# Patient Record
Sex: FEMALE | Race: WHITE | URBAN - METROPOLITAN AREA
[De-identification: names, ages, dates, MRNs, and addresses within clinical notes are randomized per-mention and may not be internally consistent; named-entity substitution may affect disease eponyms.]

---

## 2017-01-28 ENCOUNTER — OUTPATIENT (OUTPATIENT)
Dept: OUTPATIENT SERVICES | Facility: HOSPITAL | Age: 47
LOS: 1 days | Discharge: HOME | End: 2017-01-28

## 2017-06-27 DIAGNOSIS — Z12.31 ENCOUNTER FOR SCREENING MAMMOGRAM FOR MALIGNANT NEOPLASM OF BREAST: ICD-10-CM

## 2017-12-08 ENCOUNTER — OUTPATIENT (OUTPATIENT)
Dept: OUTPATIENT SERVICES | Facility: HOSPITAL | Age: 47
LOS: 1 days | Discharge: HOME | End: 2017-12-08

## 2017-12-08 DIAGNOSIS — N63.10 UNSPECIFIED LUMP IN THE RIGHT BREAST, UNSPECIFIED QUADRANT: ICD-10-CM

## 2018-01-08 PROBLEM — Z00.00 ENCOUNTER FOR PREVENTIVE HEALTH EXAMINATION: Status: ACTIVE | Noted: 2018-01-08

## 2018-02-05 ENCOUNTER — APPOINTMENT (OUTPATIENT)
Dept: SURGERY | Facility: CLINIC | Age: 48
End: 2018-02-05
Payer: COMMERCIAL

## 2018-02-05 VITALS
WEIGHT: 194 LBS | BODY MASS INDEX: 36.16 KG/M2 | SYSTOLIC BLOOD PRESSURE: 130 MMHG | HEIGHT: 61.5 IN | DIASTOLIC BLOOD PRESSURE: 78 MMHG

## 2018-02-05 DIAGNOSIS — Z87.2 PERSONAL HISTORY OF DISEASES OF THE SKIN AND SUBCUTANEOUS TISSUE: ICD-10-CM

## 2018-02-05 DIAGNOSIS — F15.90 OTHER STIMULANT USE, UNSPECIFIED, UNCOMPLICATED: ICD-10-CM

## 2018-02-05 PROCEDURE — 99202 OFFICE O/P NEW SF 15 MIN: CPT

## 2018-02-15 ENCOUNTER — OUTPATIENT (OUTPATIENT)
Dept: OUTPATIENT SERVICES | Facility: HOSPITAL | Age: 48
LOS: 1 days | Discharge: HOME | End: 2018-02-15

## 2018-02-15 VITALS
HEART RATE: 76 BPM | RESPIRATION RATE: 16 BRPM | SYSTOLIC BLOOD PRESSURE: 131 MMHG | HEIGHT: 61 IN | WEIGHT: 190.04 LBS | OXYGEN SATURATION: 96 % | TEMPERATURE: 96 F | DIASTOLIC BLOOD PRESSURE: 88 MMHG

## 2018-02-15 DIAGNOSIS — Z98.891 HISTORY OF UTERINE SCAR FROM PREVIOUS SURGERY: Chronic | ICD-10-CM

## 2018-02-15 DIAGNOSIS — L72.0 EPIDERMAL CYST: ICD-10-CM

## 2018-02-15 DIAGNOSIS — Z90.49 ACQUIRED ABSENCE OF OTHER SPECIFIED PARTS OF DIGESTIVE TRACT: Chronic | ICD-10-CM

## 2018-02-15 DIAGNOSIS — Z98.890 OTHER SPECIFIED POSTPROCEDURAL STATES: Chronic | ICD-10-CM

## 2018-02-15 DIAGNOSIS — S36.00XA UNSPECIFIED INJURY OF SPLEEN, INITIAL ENCOUNTER: Chronic | ICD-10-CM

## 2018-02-15 DIAGNOSIS — Z90.89 ACQUIRED ABSENCE OF OTHER ORGANS: Chronic | ICD-10-CM

## 2018-02-15 DIAGNOSIS — Z01.818 ENCOUNTER FOR OTHER PREPROCEDURAL EXAMINATION: ICD-10-CM

## 2018-02-15 NOTE — H&P PST ADULT - NSANTHOSAYNRD_GEN_A_CORE
No. KATHIE screening performed.  STOP BANG Legend: 0-2 = LOW Risk; 3-4 = INTERMEDIATE Risk; 5-8 = HIGH Risk/see tool
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
## 2018-02-15 NOTE — H&P PST ADULT - REASON FOR ADMISSION
48 yo female presents with c/o right breast cyst "I have had it for the past 5 yrs& it got infected in november& I had antibiotics& now  is removing it" pt denies any current drainage  denies cp,palp,sob,dyspnea,dysuria; ex sophie: 2 fos/blocks w/o sob

## 2018-02-16 LAB
ANION GAP SERPL CALC-SCNC: 14 MMOL/L — SIGNIFICANT CHANGE UP (ref 7–14)
APTT BLD: 29.1 SEC — SIGNIFICANT CHANGE UP (ref 27–39.2)
BASOPHILS # BLD AUTO: 0.12 K/UL — SIGNIFICANT CHANGE UP (ref 0–0.2)
BASOPHILS NFR BLD AUTO: 0.8 % — SIGNIFICANT CHANGE UP (ref 0–1)
BUN SERPL-MCNC: 16 MG/DL — SIGNIFICANT CHANGE UP (ref 10–20)
CALCIUM SERPL-MCNC: 9.6 MG/DL — SIGNIFICANT CHANGE UP (ref 8.5–10.1)
CHLORIDE SERPL-SCNC: 102 MMOL/L — SIGNIFICANT CHANGE UP (ref 98–110)
CO2 SERPL-SCNC: 23 MMOL/L — SIGNIFICANT CHANGE UP (ref 17–32)
CREAT SERPL-MCNC: 0.7 MG/DL — SIGNIFICANT CHANGE UP (ref 0.7–1.5)
EOSINOPHIL # BLD AUTO: 0.87 K/UL — HIGH (ref 0–0.7)
EOSINOPHIL NFR BLD AUTO: 5.7 % — SIGNIFICANT CHANGE UP (ref 0–8)
GLUCOSE SERPL-MCNC: 55 MG/DL — LOW (ref 70–110)
HCT VFR BLD CALC: 41.3 % — SIGNIFICANT CHANGE UP (ref 37–47)
HGB BLD-MCNC: 13.5 G/DL — LOW (ref 14–18)
IMM GRANULOCYTES NFR BLD AUTO: 0.3 % — SIGNIFICANT CHANGE UP (ref 0.1–0.3)
INR BLD: 0.95 RATIO — SIGNIFICANT CHANGE UP (ref 0.65–1.3)
LYMPHOCYTES # BLD AUTO: 40.5 % — SIGNIFICANT CHANGE UP (ref 20.5–51.1)
LYMPHOCYTES # BLD AUTO: 6.15 K/UL — HIGH (ref 1.2–3.4)
MCHC RBC-ENTMCNC: 30.8 PG — SIGNIFICANT CHANGE UP (ref 27–31)
MCHC RBC-ENTMCNC: 32.7 G/DL — SIGNIFICANT CHANGE UP (ref 32–37)
MCV RBC AUTO: 94.3 FL — HIGH (ref 81–91)
MONOCYTES # BLD AUTO: 0.96 K/UL — HIGH (ref 0.1–0.6)
MONOCYTES NFR BLD AUTO: 6.3 % — SIGNIFICANT CHANGE UP (ref 1.7–9.3)
NEUTROPHILS # BLD AUTO: 7.06 K/UL — HIGH (ref 1.4–6.5)
NEUTROPHILS NFR BLD AUTO: 46.4 % — SIGNIFICANT CHANGE UP (ref 42.2–75.2)
NRBC # BLD: 0 /100 WBCS — SIGNIFICANT CHANGE UP (ref 0–0)
PLATELET # BLD AUTO: 392 K/UL — SIGNIFICANT CHANGE UP (ref 130–400)
POTASSIUM SERPL-MCNC: 4.5 MMOL/L — SIGNIFICANT CHANGE UP (ref 3.5–5)
POTASSIUM SERPL-SCNC: 4.5 MMOL/L — SIGNIFICANT CHANGE UP (ref 3.5–5)
PROTHROM AB SERPL-ACNC: 10.3 SEC — SIGNIFICANT CHANGE UP (ref 9.95–12.87)
RBC # BLD: 4.38 M/UL — SIGNIFICANT CHANGE UP (ref 4.2–5.4)
RBC # FLD: 13.3 % — SIGNIFICANT CHANGE UP (ref 11.5–14.5)
SODIUM SERPL-SCNC: 139 MMOL/L — SIGNIFICANT CHANGE UP (ref 135–146)
WBC # BLD: 15.2 K/UL — HIGH (ref 4.8–10.8)
WBC # FLD AUTO: 15.2 K/UL — HIGH (ref 4.8–10.8)

## 2018-02-19 ENCOUNTER — TRANSCRIPTION ENCOUNTER (OUTPATIENT)
Age: 48
End: 2018-02-19

## 2018-02-28 ENCOUNTER — OUTPATIENT (OUTPATIENT)
Dept: OUTPATIENT SERVICES | Facility: HOSPITAL | Age: 48
LOS: 1 days | Discharge: HOME | End: 2018-02-28

## 2018-02-28 ENCOUNTER — RESULT REVIEW (OUTPATIENT)
Age: 48
End: 2018-02-28

## 2018-02-28 VITALS
TEMPERATURE: 98 F | RESPIRATION RATE: 18 BRPM | DIASTOLIC BLOOD PRESSURE: 69 MMHG | OXYGEN SATURATION: 96 % | WEIGHT: 190.04 LBS | HEIGHT: 61 IN | SYSTOLIC BLOOD PRESSURE: 129 MMHG | HEART RATE: 70 BPM

## 2018-02-28 VITALS
RESPIRATION RATE: 22 BRPM | SYSTOLIC BLOOD PRESSURE: 128 MMHG | DIASTOLIC BLOOD PRESSURE: 75 MMHG | HEART RATE: 76 BPM | OXYGEN SATURATION: 95 % | TEMPERATURE: 98 F

## 2018-02-28 DIAGNOSIS — Z98.890 OTHER SPECIFIED POSTPROCEDURAL STATES: Chronic | ICD-10-CM

## 2018-02-28 DIAGNOSIS — S36.00XA UNSPECIFIED INJURY OF SPLEEN, INITIAL ENCOUNTER: Chronic | ICD-10-CM

## 2018-02-28 DIAGNOSIS — Z90.49 ACQUIRED ABSENCE OF OTHER SPECIFIED PARTS OF DIGESTIVE TRACT: Chronic | ICD-10-CM

## 2018-02-28 DIAGNOSIS — Z98.891 HISTORY OF UTERINE SCAR FROM PREVIOUS SURGERY: Chronic | ICD-10-CM

## 2018-02-28 DIAGNOSIS — Z90.89 ACQUIRED ABSENCE OF OTHER ORGANS: Chronic | ICD-10-CM

## 2018-02-28 RX ORDER — SODIUM CHLORIDE 9 MG/ML
1000 INJECTION, SOLUTION INTRAVENOUS
Qty: 0 | Refills: 0 | Status: DISCONTINUED | OUTPATIENT
Start: 2018-02-28 | End: 2018-03-15

## 2018-02-28 RX ORDER — KETOROLAC TROMETHAMINE 30 MG/ML
30 SYRINGE (ML) INJECTION ONCE
Qty: 0 | Refills: 0 | Status: DISCONTINUED | OUTPATIENT
Start: 2018-02-28 | End: 2018-02-28

## 2018-02-28 NOTE — CHART NOTE - NSCHARTNOTEFT_GEN_A_CORE
PACU ANESTHESIA ADMISSION NOTE      Procedure:   Post op diagnosis:  Breast cyst, right      ____  Intubated  TV:______       Rate: ______      FiO2: ______    __x__  Patent Airway    __x__  Full return of protective reflexes    ____x  Full recovery from anesthesia / back to baseline     Vitals:   T: 98.2          R: 16                 BP: 115/55                 Sat: 99                  P: 72      Mental Status: x ____ Awake   x_____ Alert   _____ Drowsy   _____ Sedated    Nausea/Vomiting:  _x___ NO  ______Yes, x  See Post - Op Orders        x  Pain Scale (0-10):  __0___    Treatment: ____ None  x ____ See Post - Op/PCA Orders    Post - Operative Fluids:   ____ Oral   ____ See Post - Op Orders    Plan: Discharge:  x ____Home       _____Floor     _____Critical Care    _____  Other:_________________    Comments:

## 2018-02-28 NOTE — ASU DISCHARGE PLAN (ADULT/PEDIATRIC). - NOTIFY
Fever greater than 101/Swelling that continues/Persistent Nausea and Vomiting/Bleeding that does not stop/Pain not relieved by Medications

## 2018-02-28 NOTE — ASU DISCHARGE PLAN (ADULT/PEDIATRIC). - MEDICATION SUMMARY - MEDICATIONS TO TAKE
I will START or STAY ON the medications listed below when I get home from the hospital:    Tylenol 500 mg oral tablet  -- 2 tab(s) by mouth every 6 hours  -- Indication: For L72.0,60879

## 2018-03-05 DIAGNOSIS — Z88.5 ALLERGY STATUS TO NARCOTIC AGENT: ICD-10-CM

## 2018-03-05 DIAGNOSIS — N60.01 SOLITARY CYST OF RIGHT BREAST: ICD-10-CM

## 2018-03-06 DIAGNOSIS — L72.0 EPIDERMAL CYST: ICD-10-CM

## 2018-03-12 ENCOUNTER — APPOINTMENT (OUTPATIENT)
Dept: SURGERY | Facility: CLINIC | Age: 48
End: 2018-03-12
Payer: COMMERCIAL

## 2018-03-12 VITALS
WEIGHT: 190 LBS | HEIGHT: 61.5 IN | BODY MASS INDEX: 35.41 KG/M2 | DIASTOLIC BLOOD PRESSURE: 76 MMHG | SYSTOLIC BLOOD PRESSURE: 132 MMHG

## 2018-03-12 DIAGNOSIS — L72.0 EPIDERMAL CYST: ICD-10-CM

## 2018-03-12 PROCEDURE — 99024 POSTOP FOLLOW-UP VISIT: CPT

## 2018-04-24 NOTE — PRE-ANESTHESIA EVALUATION ADULT - NSANTHDIETYNSD_GEN_ALL_CORE
Patient requests refill of medication, pharmacy set up and verified.  She has appointment with you 5/23 but will need BC by 5/6.  
Yes

## 2018-12-15 ENCOUNTER — OUTPATIENT (OUTPATIENT)
Dept: OUTPATIENT SERVICES | Facility: HOSPITAL | Age: 48
LOS: 1 days | Discharge: HOME | End: 2018-12-15

## 2018-12-15 DIAGNOSIS — Z12.31 ENCOUNTER FOR SCREENING MAMMOGRAM FOR MALIGNANT NEOPLASM OF BREAST: ICD-10-CM

## 2018-12-15 DIAGNOSIS — Z98.891 HISTORY OF UTERINE SCAR FROM PREVIOUS SURGERY: Chronic | ICD-10-CM

## 2018-12-15 DIAGNOSIS — S36.00XA UNSPECIFIED INJURY OF SPLEEN, INITIAL ENCOUNTER: Chronic | ICD-10-CM

## 2018-12-15 DIAGNOSIS — Z98.890 OTHER SPECIFIED POSTPROCEDURAL STATES: Chronic | ICD-10-CM

## 2018-12-15 DIAGNOSIS — Z90.89 ACQUIRED ABSENCE OF OTHER ORGANS: Chronic | ICD-10-CM

## 2018-12-15 DIAGNOSIS — Z90.49 ACQUIRED ABSENCE OF OTHER SPECIFIED PARTS OF DIGESTIVE TRACT: Chronic | ICD-10-CM

## 2018-12-15 PROBLEM — E66.9 OBESITY, UNSPECIFIED: Chronic | Status: ACTIVE | Noted: 2018-02-15

## 2018-12-15 PROBLEM — K44.9 DIAPHRAGMATIC HERNIA WITHOUT OBSTRUCTION OR GANGRENE: Chronic | Status: ACTIVE | Noted: 2018-02-15

## 2019-12-26 ENCOUNTER — FORM ENCOUNTER (OUTPATIENT)
Age: 49
End: 2019-12-26

## 2019-12-27 ENCOUNTER — OUTPATIENT (OUTPATIENT)
Dept: OUTPATIENT SERVICES | Facility: HOSPITAL | Age: 49
LOS: 1 days | Discharge: HOME | End: 2019-12-27
Payer: COMMERCIAL

## 2019-12-27 DIAGNOSIS — Z98.890 OTHER SPECIFIED POSTPROCEDURAL STATES: Chronic | ICD-10-CM

## 2019-12-27 DIAGNOSIS — S36.00XA UNSPECIFIED INJURY OF SPLEEN, INITIAL ENCOUNTER: Chronic | ICD-10-CM

## 2019-12-27 DIAGNOSIS — Z90.49 ACQUIRED ABSENCE OF OTHER SPECIFIED PARTS OF DIGESTIVE TRACT: Chronic | ICD-10-CM

## 2019-12-27 DIAGNOSIS — Z90.89 ACQUIRED ABSENCE OF OTHER ORGANS: Chronic | ICD-10-CM

## 2019-12-27 DIAGNOSIS — Z98.891 HISTORY OF UTERINE SCAR FROM PREVIOUS SURGERY: Chronic | ICD-10-CM

## 2019-12-27 DIAGNOSIS — Z12.31 ENCOUNTER FOR SCREENING MAMMOGRAM FOR MALIGNANT NEOPLASM OF BREAST: ICD-10-CM

## 2019-12-27 PROCEDURE — 77067 SCR MAMMO BI INCL CAD: CPT | Mod: 26

## 2019-12-27 PROCEDURE — 77063 BREAST TOMOSYNTHESIS BI: CPT | Mod: 26

## 2020-01-17 ENCOUNTER — APPOINTMENT (OUTPATIENT)
Dept: OBGYN | Facility: CLINIC | Age: 50
End: 2020-01-17
Payer: COMMERCIAL

## 2020-01-17 VITALS — SYSTOLIC BLOOD PRESSURE: 122 MMHG | DIASTOLIC BLOOD PRESSURE: 80 MMHG

## 2020-01-17 DIAGNOSIS — Z86.19 PERSONAL HISTORY OF OTHER INFECTIOUS AND PARASITIC DISEASES: ICD-10-CM

## 2020-01-17 DIAGNOSIS — Z87.42 PERSONAL HISTORY OF OTHER DISEASES OF THE FEMALE GENITAL TRACT: ICD-10-CM

## 2020-01-17 DIAGNOSIS — Z80.3 FAMILY HISTORY OF MALIGNANT NEOPLASM OF BREAST: ICD-10-CM

## 2020-01-17 DIAGNOSIS — R35.0 FREQUENCY OF MICTURITION: ICD-10-CM

## 2020-01-17 DIAGNOSIS — R23.2 FLUSHING: ICD-10-CM

## 2020-01-17 DIAGNOSIS — N95.1 MENOPAUSAL AND FEMALE CLIMACTERIC STATES: ICD-10-CM

## 2020-01-17 PROCEDURE — 99396 PREV VISIT EST AGE 40-64: CPT

## 2020-01-17 PROCEDURE — 99213 OFFICE O/P EST LOW 20 MIN: CPT | Mod: 25

## 2020-04-12 ENCOUNTER — TRANSCRIPTION ENCOUNTER (OUTPATIENT)
Age: 50
End: 2020-04-12

## 2020-04-15 ENCOUNTER — APPOINTMENT (OUTPATIENT)
Dept: CARDIOTHORACIC SURGERY | Facility: CLINIC | Age: 50
End: 2020-04-15
Payer: COMMERCIAL

## 2020-04-15 VITALS
OXYGEN SATURATION: 96 % | WEIGHT: 174 LBS | HEART RATE: 61 BPM | RESPIRATION RATE: 13 BRPM | SYSTOLIC BLOOD PRESSURE: 142 MMHG | BODY MASS INDEX: 32.02 KG/M2 | DIASTOLIC BLOOD PRESSURE: 102 MMHG | HEIGHT: 62 IN | TEMPERATURE: 98.2 F

## 2020-04-15 VITALS — SYSTOLIC BLOOD PRESSURE: 131 MMHG | DIASTOLIC BLOOD PRESSURE: 89 MMHG

## 2020-04-15 PROCEDURE — 99244 OFF/OP CNSLTJ NEW/EST MOD 40: CPT

## 2020-04-15 NOTE — PHYSICAL EXAM
[General Appearance - Alert] : alert [General Appearance - In No Acute Distress] : in no acute distress [Abnormal Walk] : normal gait [Nail Clubbing] : no clubbing  or cyanosis of the fingernails [Musculoskeletal - Swelling] : no joint swelling seen [Motor Tone] : muscle strength and tone were normal [Deep Tendon Reflexes (DTR)] : deep tendon reflexes were 2+ and symmetric [Sensation] : the sensory exam was normal to light touch and pinprick [No Focal Deficits] : no focal deficits [Oriented To Time, Place, And Person] : oriented to person, place, and time [Impaired Insight] : insight and judgment were intact [Affect] : the affect was normal [] : no respiratory distress [Auscultation Breath Sounds / Voice Sounds] : lungs were clear to auscultation bilaterally [Heart Rate And Rhythm] : heart rate was normal and rhythm regular [Heart Sounds] : normal S1 and S2 [Heart Sounds Gallop] : no gallops [Murmurs] : no murmurs [Heart Sounds Pericardial Friction Rub] : no pericardial rub [Cervical Lymph Nodes Enlarged Posterior Bilaterally] : posterior cervical [Cervical Lymph Nodes Enlarged Anterior Bilaterally] : anterior cervical [Supraclavicular Lymph Nodes Enlarged Bilaterally] : supraclavicular

## 2020-04-20 NOTE — ASSESSMENT
[FreeTextEntry1] : Ms. SHREE FISCHER is a 49 year F PMH measles, mumps, who presents to our office today for a consultation for a lung mass referred to our office by Dr. Scott. Radiologic imaging reviewed. Further diagnostic testing, including biopsy by IR, surgical treatment options discussed at length with the patient and her     in attendance. All risks and benefits were explained to the patient. Educational handouts were given for the patient to review at home. At present she will have a PET scan and PFTs done with a tentative plan for the OR in 6-8 weeks (in light of the COVID19 pandemic), which will be preceded by presurgical testing. She will start Zyban for smoking cessation.  Patient verbalizes understanding and is in agreement.\par \par CHUY, Jeremi Ward, [facility]-BC, am acting as scribe for Dr. Al Pacheco.\par \par I personally performed the services described in the documentation, reviewed the documentation recorded by the scribe in my presence and it accurately and completely records my words and actions.\par \par \par \par \par \par
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
## 2020-04-20 NOTE — DATA REVIEWED
[FreeTextEntry1] : IMPRESSION:\par \par Spiculated mass left upper lobe suspicious for malignancy. This corresponds to the abnormality questioned on chest x-ray\par Nodule superior to it in the left lower lobe likely local metastases.\par Subsolid lesion right upper lobe should be closely followed up. Metastatic lesion not entirely excluded.\par Mediastinal adenopathy is minimally larger as compared to the previous study. However none of the lymph nodes are enlarged by absolute criteria.\par \par \par \par \par \par \par SUSPICIOUS LESION LEFT UPPER LOBE. THIS CORRESPONDS TO THE CHEST X-RAY FINDING.\par \par CT CHEST WITHOUT IV CONTRAST\par \par CLINICAL INDICATION: Follow-up to previous. No known primary. 30 pack years smoking history chest x-ray abnormality questioned left upper lobe\par \par COMPARISON: 2016\par \par TECHNIQUE: Noncontrast chest CT was performed. Multiplanar CT and HRCT images were reviewed.\par \par FINDINGS:\par \par LUNGS, AIRWAYS: Trachea main and visualized segmental bronchi patent. Trachea deviated to the left secondary to a right-sided aortic arch.\par Patchy groundglass densities throughout both lungs. These are more predominantly in the upper and mid lung fields. There were present previously also.\par There is a new spiculated mass in the left upper lobe 2.6 x 1.8 cm. This is suspicious for neoplasm.\par Superior to it in image 11 is a pleural-based density measuring 8 mm. It likely represents local tumor extension.\par Few millimeter subsolid nodule in the right upper lobe best seen in series 4 image 101. It measures 8 mm. The solid component measures 4 mm.\par Minimal subsegmental atelectasis in the right middle lobe.\par Scarring in the left lower lobe stable.\par Minimal subsegmental atelectasis in the lingula stable.\par \par PLEURA: No pleural disease.\par \par MEDIASTINUM, ARI, LYMPH NODES: No enlarged axillary adenopathy. Superior mediastinal adenopathy image 16. There are largest lymph node has a short axis of 8 mm. Previously 6 mm. It is not significant by size.\par Adenopathy in the aortopulmonary window is also larger. Lymph nodes have a short axis of 8 mm and 10 mm. Previously 6 mm and 7 mm respectively.\par Precarinal adenopathy short axis IX mm stable. No subcarinal nodes. No definite left hilar nodes. Thoracic esophagus normal.\par \par HEART AND VESSELS: Heart size normal. 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 Ascending aorta measures 3.1 cm which is normal. Descending aorta is on the right and measures 2.6 cm which is normal.\par \par UPPER ABDOMEN: Cholecystectomy. Left nephrectomy with compensatory hypertrophy of the right kidney. Spleen is small and lobulated in shape. This is as previously..\par \par BONES AND SOFT TISSUES: Soft tissues normal. Small epigastric hernia containing fat in image 58. It was present previously. It is unchanged in size. Old healed rib fractures on the left. No aggressive bony lesion.\par \par LOWER NECK: Normal.\par \par ================================================================================\par \par

## 2020-04-22 ENCOUNTER — RESULT REVIEW (OUTPATIENT)
Age: 50
End: 2020-04-22

## 2020-04-22 ENCOUNTER — OUTPATIENT (OUTPATIENT)
Dept: OUTPATIENT SERVICES | Facility: HOSPITAL | Age: 50
LOS: 1 days | Discharge: HOME | End: 2020-04-22
Payer: COMMERCIAL

## 2020-04-22 DIAGNOSIS — Z98.891 HISTORY OF UTERINE SCAR FROM PREVIOUS SURGERY: Chronic | ICD-10-CM

## 2020-04-22 DIAGNOSIS — Z90.49 ACQUIRED ABSENCE OF OTHER SPECIFIED PARTS OF DIGESTIVE TRACT: Chronic | ICD-10-CM

## 2020-04-22 DIAGNOSIS — S36.00XA UNSPECIFIED INJURY OF SPLEEN, INITIAL ENCOUNTER: Chronic | ICD-10-CM

## 2020-04-22 DIAGNOSIS — Z98.890 OTHER SPECIFIED POSTPROCEDURAL STATES: Chronic | ICD-10-CM

## 2020-04-22 DIAGNOSIS — R91.8 OTHER NONSPECIFIC ABNORMAL FINDING OF LUNG FIELD: ICD-10-CM

## 2020-04-22 DIAGNOSIS — Z90.89 ACQUIRED ABSENCE OF OTHER ORGANS: Chronic | ICD-10-CM

## 2020-04-22 LAB — GLUCOSE BLDC GLUCOMTR-MCNC: 91 MG/DL — SIGNIFICANT CHANGE UP (ref 70–99)

## 2020-04-22 PROCEDURE — 78815 PET IMAGE W/CT SKULL-THIGH: CPT | Mod: 26,PI

## 2020-04-29 ENCOUNTER — APPOINTMENT (OUTPATIENT)
Dept: CARDIOTHORACIC SURGERY | Facility: CLINIC | Age: 50
End: 2020-04-29
Payer: COMMERCIAL

## 2020-04-29 PROCEDURE — 99213 OFFICE O/P EST LOW 20 MIN: CPT | Mod: 95

## 2020-04-30 NOTE — PHYSICAL EXAM
[Oriented To Time, Place, And Person] : oriented to person, place, and time [Impaired Insight] : insight and judgment were intact [Affect] : the affect was normal

## 2020-04-30 NOTE — ASSESSMENT
[FreeTextEntry1] : Ms. Libertad Sheets 50 y/o F was called today using the [employer] telehealth platform for a follow up visit for surgical planning and for review of PET scan results. Patient reports doing well overall, has had no sick contacts and has quit smoking for 1 week with the use of Zyban. Radiologic imaging reviewed. Abnormal FDG uptake was noted in the following areas: "left upper lobe irregular opacity, a more superior 8 mm pleural-based nodule within the left upper lobe, and a 1.1 cm right paratracheal lymph node suspicious for biologic tumor activity."  Plan for EBUS 5/8/20. She was made aware that if the lymph node is positive, she would have St 3 disease and would then need an MRI of the brain to be done to rule out metastasis, which a positive result would then put her with St. 4 disease. If the lymph node is negative, she will then proceed to part 2 of her procedure which might entail a GEORGE lobectomy.  She will have 2 separate PAST dates for each procedure, the first of which will occur on 4/30 @ 815am, and COVID testing on 5/6/20. Patient verbalizes understanding, is in agreement with the plan and wishes to proceed. \par \par I, Jeremi Ward, [facility]-BC, am acting as scribe for Dr. Al Pacheco.\par I personally performed the services described in the documentation, reviewed the documentation recorded by the scribe in my presence and it accurately and completely records my words and actions.\par \par \par

## 2020-04-30 NOTE — DATA REVIEWED
[FreeTextEntry1] : EXAM: PETCT [facility] ONC FDG INIT -  2020 \par \par \par These images reveal abnormal FDG uptake coregistering with a 2.0 x 1.6 cm \par left upper lobe irregular opacity containing air-filled bronchi (SUV 7.0), a \par more superior 8 mm pleural-based nodule within the left upper lobe (SUV 4.5) \par and a 1.1 cm right paratracheal lymph node (SUV 3.5) suspicious for biologic \par tumor activity. There is nonspecific mild FDG uptake in a 6 mm right \par axillary lymph node (SUV 2.7), series 4 image 140. There are no other \par definite sites of abnormal FDG uptake to suggest biologic tumor activity. \par \par \par Additional CT findings: Sphenoid sinus polyp versus mucus retention cyst. \par Right thyroid lobe hypodense nodule. Opacities within the central bronchi \par compatible with secretions. Emphysema. Nonspecific biapical groundglass \par opacities which may be inflammatory in etiology. 3 mm nodule in the right \par upper lobe, series 4 image 118. Right-sided aortic arch with aberrant left \par subclavian artery. Patient is post cholecystectomy. Absence of the left \par kidney. Left adnexal cyst. Sigmoid diverticulosis. Degenerative change of \par the spine. Right sacroiliac joint fusion and evidence of changes. \par \par IMPRESSION: \par \par Abnormal FDG uptake coregistering with a 2.0 x 1.6 cm left upper lobe \par irregular opacity (SUV 7.0), a more superior 8 mm pleural-based nodule \par within the left upper lobe (SUV 4.5) and a 1.1 cm right paratracheal lymph \par node (SUV 3.5) suspicious for biologic tumor activity. \par \par Nonspecific mild FDG uptake in a 6 mm right axillary lymph node (SUV 2.7). \par \par No other definite sites of abnormal FDG uptake to suggest biologic tumor \par activity. \par \par =======================================================================\par \par Patient Name:  SHREE FISCHER	:   1970	  \par EXAM:  [facility]_RR_CHEST_WO (ADULT) - 3/18/20\par \par \par IMPRESSION:\par \par Spiculated mass left upper lobe suspicious for malignancy. This corresponds to the abnormality questioned on chest x-ray\par Nodule superior to it in the left lower lobe likely local metastases.\par Subsolid lesion right upper lobe should be closely followed up. Metastatic lesion not entirely excluded.\par Mediastinal adenopathy is minimally larger as compared to the previous study. However none of the lymph nodes are enlarged by absolute criteria.\par \par \par \par \par \par \par SUSPICIOUS LESION LEFT UPPER LOBE. THIS CORRESPONDS TO THE CHEST X-RAY FINDING.\par \par CT CHEST WITHOUT IV CONTRAST\par \par CLINICAL INDICATION: Follow-up to previous. No known primary. 30 pack years smoking history chest x-ray abnormality questioned left upper lobe\par \par COMPARISON: 2016\par \par TECHNIQUE: Noncontrast chest CT was performed. Multiplanar CT and HRCT images were reviewed.\par \par FINDINGS:\par \par LUNGS, AIRWAYS: Trachea main and visualized segmental bronchi patent. Trachea deviated to the left secondary to a right-sided aortic arch.\par Patchy groundglass densities throughout both lungs. These are more predominantly in the upper and mid lung fields. There were present previously also.\par There is a new spiculated mass in the left upper lobe 2.6 x 1.8 cm. This is suspicious for neoplasm.\par Superior to it in image 11 is a pleural-based density measuring 8 mm. It likely represents local tumor extension.\par Few millimeter subsolid nodule in the right upper lobe best seen in series 4 image 101. It measures 8 mm. The solid component measures 4 mm.\par Minimal subsegmental atelectasis in the right middle lobe.\par Scarring in the left lower lobe stable.\par Minimal subsegmental atelectasis in the lingula stable.\par \par PLEURA: No pleural disease.\par \par MEDIASTINUM, ARI, LYMPH NODES: No enlarged axillary adenopathy. Superior mediastinal adenopathy image 16. There are largest lymph node has a short axis of 8 mm. Previously 6 mm. It is not significant by size.\par Adenopathy in the aortopulmonary window is also larger. Lymph nodes have a short axis of 8 mm and 10 mm. Previously 6 mm and 7 mm respectively.\par Precarinal adenopathy short axis IX mm stable. No subcarinal nodes. No definite left hilar nodes. Thoracic esophagus normal.\par \par HEART AND VESSELS: Heart size normal. 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 Ascending aorta measures 3.1 cm which is normal. Descending aorta is on the right and measures 2.6 cm which is normal.\par \par UPPER ABDOMEN: Cholecystectomy. Left nephrectomy with compensatory hypertrophy of the right kidney. Spleen is small and lobulated in shape. This is as previously..\par \par BONES AND SOFT TISSUES: Soft tissues normal. Small epigastric hernia containing fat in image 58. It was present previously. It is unchanged in size. Old healed rib fractures on the left. No aggressive bony lesion.\par \par LOWER NECK: Normal.\par \par

## 2020-04-30 NOTE — HISTORY OF PRESENT ILLNESS
[Home] : at home, [unfilled] , at the time of the visit. [Medical Office: ([facility])___] : at the medical office located in  [Patient] : the patient [Self] : self [FreeTextEntry1] : Patient reports doing well overall, reports no sick contacts and states that with the help of the Zyban, she proudly reports having successfully quit smoking for exactly 1 week now. \par \par Her healthcare team is as follows:\par PMD: Gerardo Scott\par GI: Duc Man\par \par  [FreeTextEntry4] : AFMILIA Gambino

## 2020-05-01 ENCOUNTER — OUTPATIENT (OUTPATIENT)
Dept: OUTPATIENT SERVICES | Facility: HOSPITAL | Age: 50
LOS: 1 days | Discharge: HOME | End: 2020-05-01
Payer: COMMERCIAL

## 2020-05-01 VITALS
RESPIRATION RATE: 18 BRPM | DIASTOLIC BLOOD PRESSURE: 78 MMHG | HEIGHT: 62 IN | TEMPERATURE: 97 F | HEART RATE: 69 BPM | OXYGEN SATURATION: 97 % | SYSTOLIC BLOOD PRESSURE: 154 MMHG | WEIGHT: 179.68 LBS

## 2020-05-01 DIAGNOSIS — Z90.49 ACQUIRED ABSENCE OF OTHER SPECIFIED PARTS OF DIGESTIVE TRACT: Chronic | ICD-10-CM

## 2020-05-01 DIAGNOSIS — Z52.4 KIDNEY DONOR: Chronic | ICD-10-CM

## 2020-05-01 DIAGNOSIS — Z98.890 OTHER SPECIFIED POSTPROCEDURAL STATES: Chronic | ICD-10-CM

## 2020-05-01 DIAGNOSIS — R91.8 OTHER NONSPECIFIC ABNORMAL FINDING OF LUNG FIELD: ICD-10-CM

## 2020-05-01 DIAGNOSIS — Z01.818 ENCOUNTER FOR OTHER PREPROCEDURAL EXAMINATION: ICD-10-CM

## 2020-05-01 DIAGNOSIS — S36.00XA UNSPECIFIED INJURY OF SPLEEN, INITIAL ENCOUNTER: Chronic | ICD-10-CM

## 2020-05-01 DIAGNOSIS — Z98.891 HISTORY OF UTERINE SCAR FROM PREVIOUS SURGERY: Chronic | ICD-10-CM

## 2020-05-01 DIAGNOSIS — Z90.89 ACQUIRED ABSENCE OF OTHER ORGANS: Chronic | ICD-10-CM

## 2020-05-01 LAB
ALBUMIN SERPL ELPH-MCNC: 4.7 G/DL — SIGNIFICANT CHANGE UP (ref 3.5–5.2)
ALP SERPL-CCNC: 86 U/L — SIGNIFICANT CHANGE UP (ref 30–115)
ALT FLD-CCNC: 18 U/L — SIGNIFICANT CHANGE UP (ref 0–41)
ANION GAP SERPL CALC-SCNC: 11 MMOL/L — SIGNIFICANT CHANGE UP (ref 7–14)
APPEARANCE UR: CLEAR — SIGNIFICANT CHANGE UP
APTT BLD: 28.7 SEC — SIGNIFICANT CHANGE UP (ref 27–39.2)
AST SERPL-CCNC: 14 U/L — SIGNIFICANT CHANGE UP (ref 0–41)
BASOPHILS # BLD AUTO: 0.12 K/UL — SIGNIFICANT CHANGE UP (ref 0–0.2)
BASOPHILS NFR BLD AUTO: 1.2 % — HIGH (ref 0–1)
BILIRUB SERPL-MCNC: 0.4 MG/DL — SIGNIFICANT CHANGE UP (ref 0.2–1.2)
BILIRUB UR-MCNC: NEGATIVE — SIGNIFICANT CHANGE UP
BLD GP AB SCN SERPL QL: SIGNIFICANT CHANGE UP
BUN SERPL-MCNC: 12 MG/DL — SIGNIFICANT CHANGE UP (ref 10–20)
CALCIUM SERPL-MCNC: 9.7 MG/DL — SIGNIFICANT CHANGE UP (ref 8.5–10.1)
CHLORIDE SERPL-SCNC: 104 MMOL/L — SIGNIFICANT CHANGE UP (ref 98–110)
CO2 SERPL-SCNC: 26 MMOL/L — SIGNIFICANT CHANGE UP (ref 17–32)
COLOR SPEC: SIGNIFICANT CHANGE UP
CREAT SERPL-MCNC: 0.8 MG/DL — SIGNIFICANT CHANGE UP (ref 0.7–1.5)
DIFF PNL FLD: NEGATIVE — SIGNIFICANT CHANGE UP
EOSINOPHIL # BLD AUTO: 1.1 K/UL — HIGH (ref 0–0.7)
EOSINOPHIL NFR BLD AUTO: 11.2 % — HIGH (ref 0–8)
GLUCOSE SERPL-MCNC: 78 MG/DL — SIGNIFICANT CHANGE UP (ref 70–99)
GLUCOSE UR QL: NEGATIVE — SIGNIFICANT CHANGE UP
HCT VFR BLD CALC: 41 % — SIGNIFICANT CHANGE UP (ref 37–47)
HGB BLD-MCNC: 13.1 G/DL — SIGNIFICANT CHANGE UP (ref 12–16)
IMM GRANULOCYTES NFR BLD AUTO: 0.4 % — HIGH (ref 0.1–0.3)
INR BLD: 0.96 RATIO — SIGNIFICANT CHANGE UP (ref 0.65–1.3)
KETONES UR-MCNC: NEGATIVE — SIGNIFICANT CHANGE UP
LEUKOCYTE ESTERASE UR-ACNC: NEGATIVE — SIGNIFICANT CHANGE UP
LYMPHOCYTES # BLD AUTO: 4.11 K/UL — HIGH (ref 1.2–3.4)
LYMPHOCYTES # BLD AUTO: 42 % — SIGNIFICANT CHANGE UP (ref 20.5–51.1)
MCHC RBC-ENTMCNC: 31.2 PG — HIGH (ref 27–31)
MCHC RBC-ENTMCNC: 32 G/DL — SIGNIFICANT CHANGE UP (ref 32–37)
MCV RBC AUTO: 97.6 FL — SIGNIFICANT CHANGE UP (ref 81–99)
MONOCYTES # BLD AUTO: 0.7 K/UL — HIGH (ref 0.1–0.6)
MONOCYTES NFR BLD AUTO: 7.2 % — SIGNIFICANT CHANGE UP (ref 1.7–9.3)
NEUTROPHILS # BLD AUTO: 3.71 K/UL — SIGNIFICANT CHANGE UP (ref 1.4–6.5)
NEUTROPHILS NFR BLD AUTO: 38 % — LOW (ref 42.2–75.2)
NITRITE UR-MCNC: NEGATIVE — SIGNIFICANT CHANGE UP
NRBC # BLD: 0 /100 WBCS — SIGNIFICANT CHANGE UP (ref 0–0)
PH UR: 6.5 — SIGNIFICANT CHANGE UP (ref 5–8)
PLATELET # BLD AUTO: 341 K/UL — SIGNIFICANT CHANGE UP (ref 130–400)
POTASSIUM SERPL-MCNC: 5.3 MMOL/L — HIGH (ref 3.5–5)
POTASSIUM SERPL-SCNC: 5.3 MMOL/L — HIGH (ref 3.5–5)
PROT SERPL-MCNC: 7.3 G/DL — SIGNIFICANT CHANGE UP (ref 6–8)
PROT UR-MCNC: NEGATIVE — SIGNIFICANT CHANGE UP
PROTHROM AB SERPL-ACNC: 11 SEC — SIGNIFICANT CHANGE UP (ref 9.95–12.87)
RBC # BLD: 4.2 M/UL — SIGNIFICANT CHANGE UP (ref 4.2–5.4)
RBC # FLD: 13.2 % — SIGNIFICANT CHANGE UP (ref 11.5–14.5)
SODIUM SERPL-SCNC: 141 MMOL/L — SIGNIFICANT CHANGE UP (ref 135–146)
SP GR SPEC: 1.01 — SIGNIFICANT CHANGE UP (ref 1.01–1.02)
UROBILINOGEN FLD QL: SIGNIFICANT CHANGE UP
WBC # BLD: 9.78 K/UL — SIGNIFICANT CHANGE UP (ref 4.8–10.8)
WBC # FLD AUTO: 9.78 K/UL — SIGNIFICANT CHANGE UP (ref 4.8–10.8)

## 2020-05-01 PROCEDURE — 93010 ELECTROCARDIOGRAM REPORT: CPT

## 2020-05-01 RX ORDER — ACETAMINOPHEN 500 MG
2 TABLET ORAL
Qty: 0 | Refills: 0 | DISCHARGE
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
## 2020-05-01 NOTE — H&P PST ADULT - HISTORY OF PRESENT ILLNESS
PATIENT CURRENTLY DENIES CHEST PAIN  SHORTNESS OF BREATH  PALPITATIONS,  DYSURIA, OR UPPER RESPIRATORY INFECTION IN PAST 2 WEEKS  EXERCISE  TOLERANCE  1-2 FLIGHT OF STAIRS  WITHOUT SHORTNESS OF BREATH   denies travel outside the [place] in the past 30 days   denies any covid s/s

## 2020-05-01 NOTE — H&P PST ADULT - NSICDXPASTMEDICALHX_GEN_ALL_CORE_FT
PAST MEDICAL HISTORY:  COPD (chronic obstructive pulmonary disease) pt denies    Enlarged lymph node     Hiatal hernia     Obesity

## 2020-05-01 NOTE — H&P PST ADULT - NSANTHOSAYNRD_GEN_A_CORE
No. KATHIE screening performed.  STOP BANG Legend: 0-2 = LOW Risk; 3-4 = INTERMEDIATE Risk; 5-8 = HIGH Risk/test negative

## 2020-05-01 NOTE — H&P PST ADULT - REASON FOR ADMISSION
endobronchial ultrasound hx: smoking on yrly cxr abnormal, ct abnorma, pet scan lite up  and has borderline enlarged lymph node

## 2020-05-06 ENCOUNTER — OUTPATIENT (OUTPATIENT)
Dept: OUTPATIENT SERVICES | Facility: HOSPITAL | Age: 50
LOS: 1 days | Discharge: HOME | End: 2020-05-06

## 2020-05-06 DIAGNOSIS — Z11.59 ENCOUNTER FOR SCREENING FOR OTHER VIRAL DISEASES: ICD-10-CM

## 2020-05-06 DIAGNOSIS — S36.00XA UNSPECIFIED INJURY OF SPLEEN, INITIAL ENCOUNTER: Chronic | ICD-10-CM

## 2020-05-06 DIAGNOSIS — Z98.890 OTHER SPECIFIED POSTPROCEDURAL STATES: Chronic | ICD-10-CM

## 2020-05-06 DIAGNOSIS — Z90.89 ACQUIRED ABSENCE OF OTHER ORGANS: Chronic | ICD-10-CM

## 2020-05-06 DIAGNOSIS — Z01.818 ENCOUNTER FOR OTHER PREPROCEDURAL EXAMINATION: ICD-10-CM

## 2020-05-06 DIAGNOSIS — Z52.4 KIDNEY DONOR: Chronic | ICD-10-CM

## 2020-05-06 DIAGNOSIS — Z98.891 HISTORY OF UTERINE SCAR FROM PREVIOUS SURGERY: Chronic | ICD-10-CM

## 2020-05-06 DIAGNOSIS — Z90.49 ACQUIRED ABSENCE OF OTHER SPECIFIED PARTS OF DIGESTIVE TRACT: Chronic | ICD-10-CM

## 2020-05-06 PROBLEM — R59.9 ENLARGED LYMPH NODES, UNSPECIFIED: Chronic | Status: ACTIVE | Noted: 2020-05-01

## 2020-05-06 PROBLEM — J44.9 CHRONIC OBSTRUCTIVE PULMONARY DISEASE, UNSPECIFIED: Chronic | Status: ACTIVE | Noted: 2018-02-15

## 2020-05-07 VITALS — HEIGHT: 62 IN | WEIGHT: 179.9 LBS

## 2020-05-07 LAB — SARS-COV-2 RNA SPEC QL NAA+PROBE: SIGNIFICANT CHANGE UP
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
## 2020-05-07 NOTE — PATIENT PROFILE ADULT - DISASTER - NSPROIMPLANTSMEDDEV_GEN_A_NUR
None Gen: denies fever, chills  HENT: denies throat pain, nasal congestion  Eye: denies changes in vision, eye pain  CV: denies chest pain, palpitations  Pulm: + SOB, cough, sputum production  Abd: denies abd pain, N/V/D/C  : denies hematuria, dysuria  Skin: denies rash, itching  Ext: + LE edema, +foot and hand and finger cramping  Neuro: denies HA, dizziness, lightheadedness

## 2020-05-07 NOTE — PRE-OP CHECKLIST - INTERNAL PROSTHESES
Chief Complaint   Patient presents with    Medication Refill     Cymbalta    Cold Symptoms     chest congestion, productive cough, sneezing, OTC Zycam taken     1. Have you been to the ER, urgent care clinic since your last visit? Hospitalized since your last visit? Seen by a physician in [place] for a tooth abscess. 2. Have you seen or consulted any other health care providers outside of the [address] [place] [address] since your last visit? Include any pap smears or colon screening. No    Visit Vitals  /72 (BP 1 Location: Left arm, BP Patient Position: Sitting)   Pulse 87   Temp 98.7 °F (37.1 °C) (Oral)   Resp 18   Ht 5' 9\" (1.753 m)   Wt 194 lb 12.8 oz (88.4 kg)   SpO2 98%   BMI 28.77 kg/m² no

## 2020-05-08 ENCOUNTER — TRANSCRIPTION ENCOUNTER (OUTPATIENT)
Age: 50
End: 2020-05-08

## 2020-05-08 ENCOUNTER — RESULT REVIEW (OUTPATIENT)
Age: 50
End: 2020-05-08

## 2020-05-08 ENCOUNTER — OUTPATIENT (OUTPATIENT)
Dept: OUTPATIENT SERVICES | Facility: HOSPITAL | Age: 50
LOS: 1 days | Discharge: HOME | End: 2020-05-08
Payer: COMMERCIAL

## 2020-05-08 VITALS — HEART RATE: 78 BPM | RESPIRATION RATE: 24 BRPM | OXYGEN SATURATION: 97 %

## 2020-05-08 DIAGNOSIS — Z52.4 KIDNEY DONOR: Chronic | ICD-10-CM

## 2020-05-08 DIAGNOSIS — Z90.89 ACQUIRED ABSENCE OF OTHER ORGANS: Chronic | ICD-10-CM

## 2020-05-08 DIAGNOSIS — Z98.890 OTHER SPECIFIED POSTPROCEDURAL STATES: Chronic | ICD-10-CM

## 2020-05-08 DIAGNOSIS — J44.9 CHRONIC OBSTRUCTIVE PULMONARY DISEASE, UNSPECIFIED: ICD-10-CM

## 2020-05-08 DIAGNOSIS — Z90.49 ACQUIRED ABSENCE OF OTHER SPECIFIED PARTS OF DIGESTIVE TRACT: Chronic | ICD-10-CM

## 2020-05-08 DIAGNOSIS — S36.00XA UNSPECIFIED INJURY OF SPLEEN, INITIAL ENCOUNTER: Chronic | ICD-10-CM

## 2020-05-08 DIAGNOSIS — Z98.891 HISTORY OF UTERINE SCAR FROM PREVIOUS SURGERY: Chronic | ICD-10-CM

## 2020-05-08 DIAGNOSIS — R59.0 LOCALIZED ENLARGED LYMPH NODES: ICD-10-CM

## 2020-05-08 DIAGNOSIS — E66.9 OBESITY, UNSPECIFIED: ICD-10-CM

## 2020-05-08 DIAGNOSIS — F17.210 NICOTINE DEPENDENCE, CIGARETTES, UNCOMPLICATED: ICD-10-CM

## 2020-05-08 PROCEDURE — 88305 TISSUE EXAM BY PATHOLOGIST: CPT | Mod: 26

## 2020-05-08 PROCEDURE — 71045 X-RAY EXAM CHEST 1 VIEW: CPT | Mod: 26

## 2020-05-08 PROCEDURE — 31652 BRONCH EBUS SAMPLNG 1/2 NODE: CPT

## 2020-05-08 PROCEDURE — 88342 IMHCHEM/IMCYTCHM 1ST ANTB: CPT | Mod: 26

## 2020-05-08 PROCEDURE — 88173 CYTOPATH EVAL FNA REPORT: CPT | Mod: 26

## 2020-05-08 PROCEDURE — 88341 IMHCHEM/IMCYTCHM EA ADD ANTB: CPT | Mod: 26

## 2020-05-08 NOTE — PACU DISCHARGE NOTE - COMMENTS
Patient transported to CTU to complete recovery from anesthesia.    Post-op vitals:  /55  HR 84  RR 16   SpO2 100  T 98.3

## 2020-05-08 NOTE — PACU DISCHARGE NOTE - AIRWAY PATENCY:
Subjective:   Patient ID:  Tri Staton is a 54 y.o. female who presents for follow up of Hospital Follow Up      55 yo female, f/u for severe multi-vessel CAD. Wheelchair bound after CVA.  PMH recent Dx of CAD and CVA with residual left side weakness in , DVT, HTN, MI, hypercholesteremia, and neuropathy.  , had chest pain, dyspnea and could not move. Went to [facility]. Dx of CVA. D/c'ed. Few days later, felt worse and fever. Went to hospital again. Had cath done and CABG was advised. Pt decided to go back to  for medical care.   Went to ER [facility] on  for LLE swelling and pain.  US revealed positive DVT and started eliquis. Chest CTA moderate pericardial effusion and no PE. D/c'ed from ER.  10/04, c/o chest pain and weakness. With hemarroids. Sent to ER and troponin < 0.006 and EKG did not show acute stt change.  Still c/o angina and palpitation. Has constipation  Feels fatigue.  Left side weakness and leg swelling.  No dyspnea, and dizziness.   ECHo showed normal EF and moderate pericardial effusion. No tamponade.     Quit smoking 3 months, 1 cigar a day.x 10 yrs  Quit drinking 6 months, 1 pint of liquor a day x10 yrs.  Smoked marijuana  Smoked cocaines one yr ago for 10 yrs, remote IV cocaine 25 yrs ago.              Past Medical History:   Diagnosis Date    Coronary artery disease     Coronary artery disease of native artery of native heart with stable angina pectoris 2018    Hypercholesteremia     Hypertension     Neuropathy     Stroke        Past Surgical History:   Procedure Laterality Date     SECTION         Social History     Tobacco Use    Smoking status: Former Smoker   Substance Use Topics    Alcohol use: No     Frequency: Never    Drug use: No       No family history on file.      Review of Systems   Constitution: Negative for decreased appetite, diaphoresis, fever, weakness, malaise/fatigue and night sweats.   HENT: Negative for  nosebleeds.    Eyes: Negative for blurred vision and double vision.   Cardiovascular: Positive for leg swelling. Negative for chest pain, claudication, dyspnea on exertion, irregular heartbeat, near-syncope, orthopnea, palpitations, paroxysmal nocturnal dyspnea and syncope.   Respiratory: Negative for cough, shortness of breath, sleep disturbances due to breathing, snoring, sputum production and wheezing.    Endocrine: Negative for cold intolerance and polyuria.   Hematologic/Lymphatic: Does not bruise/bleed easily.   Skin: Negative for rash.   Musculoskeletal: Positive for muscle weakness. Negative for back pain, falls, joint pain, joint swelling and neck pain.   Gastrointestinal: Negative for abdominal pain, heartburn, nausea and vomiting.   Genitourinary: Negative for dysuria, frequency and hematuria.   Neurological: Positive for loss of balance. Negative for difficulty with concentration, dizziness, focal weakness, headaches, light-headedness, numbness and seizures.   Psychiatric/Behavioral: Negative for depression, memory loss and substance abuse. The patient does not have insomnia.    Allergic/Immunologic: Negative for HIV exposure and hives.       Objective:   Physical Exam   Constitutional: She is oriented to person, place, and time. She appears well-nourished.   HENT:   Head: Normocephalic.   Eyes: Pupils are equal, round, and reactive to light.   Neck: Normal carotid pulses and no JVD present. Carotid bruit is not present. No thyromegaly present.   Cardiovascular: Normal rate, regular rhythm, normal heart sounds and normal pulses.  No extrasystoles are present. PMI is not displaced. Exam reveals no gallop and no S3.   No murmur heard.  Pulmonary/Chest: Breath sounds normal. No stridor. No respiratory distress.   Abdominal: Soft. Bowel sounds are normal. There is no tenderness. There is no rebound.   Musculoskeletal: Normal range of motion. She exhibits edema.   Trace edema   Neurological: She is alert and  oriented to person, place, and time.   Skin: Skin is intact. No rash noted.   Psychiatric: Her behavior is normal.       No results found for: CHOL  No results found for: HDL  No results found for: LDLCALC  No results found for: TRIG  No results found for: CHOLHDL    Chemistry        Component Value Date/Time     10/04/2018 1805    K 4.2 10/04/2018 1805     10/04/2018 1805    CO2 22 (L) 10/04/2018 1805    BUN 16 10/04/2018 1805    CREATININE 1.0 10/04/2018 1805    GLU 94 10/04/2018 1805        Component Value Date/Time    CALCIUM 9.5 10/04/2018 1805    ALKPHOS 73 10/04/2018 1805     (H) 10/04/2018 1805    ALT 84 (H) 10/04/2018 1805    BILITOT 0.5 10/04/2018 1805    ESTGFRAFRICA >60 10/04/2018 1805    EGFRNONAA >60 10/04/2018 1805          No results found for: LABA1C, HGBA1C  No results found for: TSH  Lab Results   Component Value Date    INR 1.2 10/04/2018    INR 1.2 09/27/2018     Lab Results   Component Value Date    WBC 5.24 10/04/2018    HGB 12.5 10/04/2018    HCT 37.8 10/04/2018    MCV 92 10/04/2018     10/04/2018     BMP  Sodium   Date Value Ref Range Status   10/04/2018 139 136 - 145 mmol/L Final     Potassium   Date Value Ref Range Status   10/04/2018 4.2 3.5 - 5.1 mmol/L Final     Chloride   Date Value Ref Range Status   10/04/2018 109 95 - 110 mmol/L Final     CO2   Date Value Ref Range Status   10/04/2018 22 (L) 23 - 29 mmol/L Final     BUN, Bld   Date Value Ref Range Status   10/04/2018 16 6 - 20 mg/dL Final     Creatinine   Date Value Ref Range Status   10/04/2018 1.0 0.5 - 1.4 mg/dL Final     Calcium   Date Value Ref Range Status   10/04/2018 9.5 8.7 - 10.5 mg/dL Final     Anion Gap   Date Value Ref Range Status   10/04/2018 8 8 - 16 mmol/L Final     eGFR if    Date Value Ref Range Status   10/04/2018 >60 >60 mL/min/1.73 m^2 Final     eGFR if non    Date Value Ref Range Status   10/04/2018 >60 >60 mL/min/1.73 m^2 Final     Comment:      Calculation used to obtain the estimated glomerular filtration  rate (eGFR) is the CKD-EPI equation.        BNP  @LABRCNTIP(BNP,BNPTRIAGEBLO)@  @LABRCNTIP(troponini)@  CrCl cannot be calculated (Unknown ideal weight.).  No results found in the last 24 hours.  No results found in the last 24 hours.  No results found in the last 24 hours.    Assessment:      1. Coronary artery disease of native artery of native heart with stable angina pectoris    2. Essential hypertension    3. Mixed hyperlipidemia    4. Pericardial effusion    5. Cerebrovascular accident (CVA), unspecified mechanism    6. History of cocaine abuse    7. History of alcohol abuse      Severe multie-vessel CAD with stable angina  Recent acute CVA and positive DVT    Plan:   Decrease hydralazine to 25 mg bid  Increase Imdur from 60 mg daily to bid  Add NTG SL prn  Continue ASA and Eliquis.  Continue Lipitor, BB, ACEI, Nifedipine  Refer to neurology  Called [facility] again and the disc of cath mailed out today.  CVT f/u arranged  RTC in 2 weeks           Satisfactory

## 2020-05-11 LAB — NON-GYNECOLOGICAL CYTOLOGY STUDY: SIGNIFICANT CHANGE UP

## 2020-05-12 ENCOUNTER — OUTPATIENT (OUTPATIENT)
Dept: OUTPATIENT SERVICES | Facility: HOSPITAL | Age: 50
LOS: 1 days | Discharge: HOME | End: 2020-05-12
Payer: COMMERCIAL

## 2020-05-12 ENCOUNTER — RESULT REVIEW (OUTPATIENT)
Age: 50
End: 2020-05-12

## 2020-05-12 VITALS
DIASTOLIC BLOOD PRESSURE: 80 MMHG | HEART RATE: 72 BPM | HEIGHT: 62 IN | RESPIRATION RATE: 16 BRPM | TEMPERATURE: 97 F | OXYGEN SATURATION: 99 % | WEIGHT: 180.78 LBS | SYSTOLIC BLOOD PRESSURE: 152 MMHG

## 2020-05-12 DIAGNOSIS — Z98.890 OTHER SPECIFIED POSTPROCEDURAL STATES: Chronic | ICD-10-CM

## 2020-05-12 DIAGNOSIS — Z90.89 ACQUIRED ABSENCE OF OTHER ORGANS: Chronic | ICD-10-CM

## 2020-05-12 DIAGNOSIS — E66.9 OBESITY, UNSPECIFIED: ICD-10-CM

## 2020-05-12 DIAGNOSIS — Z98.891 HISTORY OF UTERINE SCAR FROM PREVIOUS SURGERY: Chronic | ICD-10-CM

## 2020-05-12 DIAGNOSIS — J94.8 OTHER SPECIFIED PLEURAL CONDITIONS: ICD-10-CM

## 2020-05-12 DIAGNOSIS — Z90.49 ACQUIRED ABSENCE OF OTHER SPECIFIED PARTS OF DIGESTIVE TRACT: Chronic | ICD-10-CM

## 2020-05-12 DIAGNOSIS — J44.9 CHRONIC OBSTRUCTIVE PULMONARY DISEASE, UNSPECIFIED: ICD-10-CM

## 2020-05-12 DIAGNOSIS — Z52.4 KIDNEY DONOR: Chronic | ICD-10-CM

## 2020-05-12 DIAGNOSIS — S36.00XA UNSPECIFIED INJURY OF SPLEEN, INITIAL ENCOUNTER: Chronic | ICD-10-CM

## 2020-05-12 DIAGNOSIS — Z01.818 ENCOUNTER FOR OTHER PREPROCEDURAL EXAMINATION: ICD-10-CM

## 2020-05-12 DIAGNOSIS — C34.12 MALIGNANT NEOPLASM OF UPPER LOBE, LEFT BRONCHUS OR LUNG: ICD-10-CM

## 2020-05-12 LAB
ALBUMIN SERPL ELPH-MCNC: 4.9 G/DL — SIGNIFICANT CHANGE UP (ref 3.5–5.2)
ALP SERPL-CCNC: 89 U/L — SIGNIFICANT CHANGE UP (ref 30–115)
ALT FLD-CCNC: 21 U/L — SIGNIFICANT CHANGE UP (ref 0–41)
ANION GAP SERPL CALC-SCNC: 16 MMOL/L — HIGH (ref 7–14)
APPEARANCE UR: CLEAR — SIGNIFICANT CHANGE UP
APTT BLD: 28.8 SEC — SIGNIFICANT CHANGE UP (ref 27–39.2)
AST SERPL-CCNC: 15 U/L — SIGNIFICANT CHANGE UP (ref 0–41)
BASOPHILS # BLD AUTO: 0.13 K/UL — SIGNIFICANT CHANGE UP (ref 0–0.2)
BASOPHILS NFR BLD AUTO: 0.9 % — SIGNIFICANT CHANGE UP (ref 0–1)
BILIRUB SERPL-MCNC: 0.4 MG/DL — SIGNIFICANT CHANGE UP (ref 0.2–1.2)
BILIRUB UR-MCNC: NEGATIVE — SIGNIFICANT CHANGE UP
BLD GP AB SCN SERPL QL: SIGNIFICANT CHANGE UP
BUN SERPL-MCNC: 12 MG/DL — SIGNIFICANT CHANGE UP (ref 10–20)
CALCIUM SERPL-MCNC: 10 MG/DL — SIGNIFICANT CHANGE UP (ref 8.5–10.1)
CHLORIDE SERPL-SCNC: 100 MMOL/L — SIGNIFICANT CHANGE UP (ref 98–110)
CO2 SERPL-SCNC: 23 MMOL/L — SIGNIFICANT CHANGE UP (ref 17–32)
COLOR SPEC: SIGNIFICANT CHANGE UP
CREAT SERPL-MCNC: 0.8 MG/DL — SIGNIFICANT CHANGE UP (ref 0.7–1.5)
DIFF PNL FLD: NEGATIVE — SIGNIFICANT CHANGE UP
EOSINOPHIL # BLD AUTO: 0.79 K/UL — HIGH (ref 0–0.7)
EOSINOPHIL NFR BLD AUTO: 5.7 % — SIGNIFICANT CHANGE UP (ref 0–8)
GLUCOSE SERPL-MCNC: 83 MG/DL — SIGNIFICANT CHANGE UP (ref 70–99)
GLUCOSE UR QL: NEGATIVE — SIGNIFICANT CHANGE UP
HCT VFR BLD CALC: 41.6 % — SIGNIFICANT CHANGE UP (ref 37–47)
HGB BLD-MCNC: 13.2 G/DL — SIGNIFICANT CHANGE UP (ref 12–16)
IMM GRANULOCYTES NFR BLD AUTO: 0.4 % — HIGH (ref 0.1–0.3)
INR BLD: 0.95 RATIO — SIGNIFICANT CHANGE UP (ref 0.65–1.3)
KETONES UR-MCNC: NEGATIVE — SIGNIFICANT CHANGE UP
LEUKOCYTE ESTERASE UR-ACNC: NEGATIVE — SIGNIFICANT CHANGE UP
LYMPHOCYTES # BLD AUTO: 27.1 % — SIGNIFICANT CHANGE UP (ref 20.5–51.1)
LYMPHOCYTES # BLD AUTO: 3.77 K/UL — HIGH (ref 1.2–3.4)
MCHC RBC-ENTMCNC: 31.1 PG — HIGH (ref 27–31)
MCHC RBC-ENTMCNC: 31.7 G/DL — LOW (ref 32–37)
MCV RBC AUTO: 98.1 FL — SIGNIFICANT CHANGE UP (ref 81–99)
MONOCYTES # BLD AUTO: 0.95 K/UL — HIGH (ref 0.1–0.6)
MONOCYTES NFR BLD AUTO: 6.8 % — SIGNIFICANT CHANGE UP (ref 1.7–9.3)
NEUTROPHILS # BLD AUTO: 8.2 K/UL — HIGH (ref 1.4–6.5)
NEUTROPHILS NFR BLD AUTO: 59.1 % — SIGNIFICANT CHANGE UP (ref 42.2–75.2)
NITRITE UR-MCNC: NEGATIVE — SIGNIFICANT CHANGE UP
NRBC # BLD: 0 /100 WBCS — SIGNIFICANT CHANGE UP (ref 0–0)
PH UR: 6.5 — SIGNIFICANT CHANGE UP (ref 5–8)
PLATELET # BLD AUTO: 392 K/UL — SIGNIFICANT CHANGE UP (ref 130–400)
POTASSIUM SERPL-MCNC: 5.1 MMOL/L — HIGH (ref 3.5–5)
POTASSIUM SERPL-SCNC: 5.1 MMOL/L — HIGH (ref 3.5–5)
PROT SERPL-MCNC: 7.5 G/DL — SIGNIFICANT CHANGE UP (ref 6–8)
PROT UR-MCNC: NEGATIVE — SIGNIFICANT CHANGE UP
PROTHROM AB SERPL-ACNC: 10.9 SEC — SIGNIFICANT CHANGE UP (ref 9.95–12.87)
RBC # BLD: 4.24 M/UL — SIGNIFICANT CHANGE UP (ref 4.2–5.4)
RBC # FLD: 13.3 % — SIGNIFICANT CHANGE UP (ref 11.5–14.5)
SODIUM SERPL-SCNC: 139 MMOL/L — SIGNIFICANT CHANGE UP (ref 135–146)
SP GR SPEC: 1.01 — LOW (ref 1.01–1.02)
UROBILINOGEN FLD QL: SIGNIFICANT CHANGE UP
WBC # BLD: 13.89 K/UL — HIGH (ref 4.8–10.8)
WBC # FLD AUTO: 13.89 K/UL — HIGH (ref 4.8–10.8)

## 2020-05-12 PROCEDURE — 93010 ELECTROCARDIOGRAM REPORT: CPT

## 2020-05-12 PROCEDURE — 71046 X-RAY EXAM CHEST 2 VIEWS: CPT | Mod: 26
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
## 2020-05-12 NOTE — H&P PST ADULT - ATTENDING COMMENTS
General Thoracic Surgery Attestation    I have seen and examined the patient.  Where appropriate I have updated, edited, or corrected the resident's or PA's note with regard to findings, values, and plan.    patient with left upper lobe dominant lesion.  PET positive.  plan for left thoracoscopy and lung resection (to include wedge resection and lobectomy if positive for malignancy, with MLNDx.)  risks and benefits reviewed with patient and she understands that these include infection, pain, bleeding, damage to thoracic structures, need for thoracotomy, recurrence, need for more procedures, chyle leak.  she understands and wishes to proceed.    only update since clinic H+P is EBUS with good lymphs and negative malignancy.

## 2020-05-12 NOTE — H&P PST ADULT - REASON FOR ADMISSION
50 yo female presents with diagnosis left lung mass, pt had lymph node biopsy done 5/8/2020 now is scheduled for thoracoscopy, & "biopsy possible removal of upper lobe"  denies chest pain, palpitations, shortness of breath, dyspnea, or dysuria. exercise tolerance: 2 blocks/ flights of stairs w/o sob ;  denies COVID-19 exposure, denies any symptoms

## 2020-05-13 ENCOUNTER — OUTPATIENT (OUTPATIENT)
Dept: OUTPATIENT SERVICES | Facility: HOSPITAL | Age: 50
LOS: 1 days | Discharge: HOME | End: 2020-05-13

## 2020-05-13 ENCOUNTER — APPOINTMENT (OUTPATIENT)
Dept: CARDIOTHORACIC SURGERY | Facility: CLINIC | Age: 50
End: 2020-05-13
Payer: COMMERCIAL

## 2020-05-13 VITALS
DIASTOLIC BLOOD PRESSURE: 91 MMHG | TEMPERATURE: 97.9 F | RESPIRATION RATE: 13 BRPM | HEART RATE: 73 BPM | OXYGEN SATURATION: 97 % | SYSTOLIC BLOOD PRESSURE: 142 MMHG

## 2020-05-13 DIAGNOSIS — Z01.818 ENCOUNTER FOR OTHER PREPROCEDURAL EXAMINATION: ICD-10-CM

## 2020-05-13 DIAGNOSIS — Z11.59 ENCOUNTER FOR SCREENING FOR OTHER VIRAL DISEASES: ICD-10-CM

## 2020-05-13 DIAGNOSIS — Z98.891 HISTORY OF UTERINE SCAR FROM PREVIOUS SURGERY: Chronic | ICD-10-CM

## 2020-05-13 DIAGNOSIS — Z90.89 ACQUIRED ABSENCE OF OTHER ORGANS: Chronic | ICD-10-CM

## 2020-05-13 DIAGNOSIS — Z52.4 KIDNEY DONOR: Chronic | ICD-10-CM

## 2020-05-13 DIAGNOSIS — S36.00XA UNSPECIFIED INJURY OF SPLEEN, INITIAL ENCOUNTER: Chronic | ICD-10-CM

## 2020-05-13 DIAGNOSIS — Z98.890 OTHER SPECIFIED POSTPROCEDURAL STATES: Chronic | ICD-10-CM

## 2020-05-13 DIAGNOSIS — Z90.49 ACQUIRED ABSENCE OF OTHER SPECIFIED PARTS OF DIGESTIVE TRACT: Chronic | ICD-10-CM

## 2020-05-13 PROCEDURE — 99213 OFFICE O/P EST LOW 20 MIN: CPT

## 2020-05-13 NOTE — PHYSICAL EXAM
[] : no respiratory distress [Heart Rate And Rhythm] : heart rate was normal and rhythm regular [Auscultation Breath Sounds / Voice Sounds] : lungs were clear to auscultation bilaterally [Heart Sounds Gallop] : no gallops [Heart Sounds] : normal S1 and S2 [Murmurs] : no murmurs [Abnormal Walk] : normal gait [Heart Sounds Pericardial Friction Rub] : no pericardial rub [Nail Clubbing] : no clubbing  or cyanosis of the fingernails [Musculoskeletal - Swelling] : no joint swelling seen [Deep Tendon Reflexes (DTR)] : deep tendon reflexes were 2+ and symmetric [Motor Tone] : muscle strength and tone were normal [No Focal Deficits] : no focal deficits [Sensation] : the sensory exam was normal to light touch and pinprick [Impaired Insight] : insight and judgment were intact [Oriented To Time, Place, And Person] : oriented to person, place, and time [Affect] : the affect was normal

## 2020-05-14 VITALS — HEIGHT: 62 IN | WEIGHT: 179.68 LBS

## 2020-05-14 LAB — SARS-COV-2 RNA SPEC QL NAA+PROBE: SIGNIFICANT CHANGE UP
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
## 2020-05-14 NOTE — DATA REVIEWED
[FreeTextEntry1] : \par EXAM:  THORAX^[facility]_RR_CHEST_WO (ADULT) - 3/18/20 ([facility])\par \par \par IMPRESSION:\par \par Spiculated mass left upper lobe suspicious for malignancy. This corresponds to the abnormality questioned on chest x-ray\par Nodule superior to it in the left lower lobe likely local metastases.\par Subsolid lesion right upper lobe should be closely followed up. Metastatic lesion not entirely excluded.\par Mediastinal adenopathy is minimally larger as compared to the previous study. However none of the lymph nodes are enlarged by absolute criteria.\par \par \par SUSPICIOUS LESION LEFT UPPER LOBE. THIS CORRESPONDS TO THE CHEST X-RAY FINDING.\par \par CT CHEST WITHOUT IV CONTRAST\par \par CLINICAL INDICATION: Follow-up to previous. No known primary. 30 pack years smoking history chest x-ray abnormality questioned left upper lobe\par \par COMPARISON: 2016\par \par TECHNIQUE: Noncontrast chest CT was performed. Multiplanar CT and HRCT images were reviewed.\par \par FINDINGS:\par \par LUNGS, AIRWAYS: Trachea main and visualized segmental bronchi patent. Trachea deviated to the left secondary to a right-sided aortic arch.\par Patchy groundglass densities throughout both lungs. These are more predominantly in the upper and mid lung fields. There were present previously also.\par There is a new spiculated mass in the left upper lobe 2.6 x 1.8 cm. This is suspicious for neoplasm.\par Superior to it in image 11 is a pleural-based density measuring 8 mm. It likely represents local tumor extension.\par Few millimeter subsolid nodule in the right upper lobe best seen in series 4 image 101. It measures 8 mm. The solid component measures 4 mm.\par Minimal subsegmental atelectasis in the right middle lobe.\par Scarring in the left lower lobe stable.\par Minimal subsegmental atelectasis in the lingula stable.\par \par PLEURA: No pleural disease.\par \par MEDIASTINUM, ARI, LYMPH NODES: No enlarged axillary adenopathy. Superior mediastinal adenopathy image 16. There are largest lymph node has a short axis of 8 mm. Previously 6 mm. It is not significant by size.\par Adenopathy in the aortopulmonary window is also larger. Lymph nodes have a short axis of 8 mm and 10 mm. Previously 6 mm and 7 mm respectively.\par Precarinal adenopathy short axis IX mm stable. No subcarinal nodes. No definite left hilar nodes. Thoracic esophagus normal.\par \par HEART AND VESSELS: Heart size normal. 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 Ascending aorta measures 3.1 cm which is normal. Descending aorta is on the right and measures 2.6 cm which is normal.\par \par UPPER ABDOMEN: Cholecystectomy. Left nephrectomy with compensatory hypertrophy of the right kidney. Spleen is small and lobulated in shape. This is as previously..\par \par BONES AND SOFT TISSUES: Soft tissues normal. Small epigastric hernia containing fat in image 58. It was present previously. It is unchanged in size. Old healed rib fractures on the left. No aggressive bony lesion.\par \par LOWER NECK: Normal.\par \par ===============================================================================\par \par Pulmonary Function Analysis - 5/8/20\par FEV1:     Pre:  75\par              Post:  78\par DLCO:       68\par
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
## 2020-05-14 NOTE — PRE-ANESTHESIA EVALUATION ADULT - NSANTHPMHFT_GEN_ALL_CORE
50 yo english speaking female hx COPD, hiatal hernia, obesity (5'2", 82kg), former smoker (35PY, quit 1 month ago), left lung mass s/p EBUS with suspicious results presents for left sided VATS and left upper lobectomy.

## 2020-05-15 ENCOUNTER — INPATIENT (INPATIENT)
Facility: HOSPITAL | Age: 50
LOS: 3 days | Discharge: HOME | End: 2020-05-19
Attending: THORACIC SURGERY (CARDIOTHORACIC VASCULAR SURGERY) | Admitting: THORACIC SURGERY (CARDIOTHORACIC VASCULAR SURGERY)
Payer: COMMERCIAL

## 2020-05-15 ENCOUNTER — RESULT REVIEW (OUTPATIENT)
Age: 50
End: 2020-05-15

## 2020-05-15 ENCOUNTER — APPOINTMENT (OUTPATIENT)
Dept: CARDIOTHORACIC SURGERY | Facility: HOSPITAL | Age: 50
End: 2020-05-15
Payer: COMMERCIAL

## 2020-05-15 DIAGNOSIS — Z98.890 OTHER SPECIFIED POSTPROCEDURAL STATES: Chronic | ICD-10-CM

## 2020-05-15 DIAGNOSIS — Z90.89 ACQUIRED ABSENCE OF OTHER ORGANS: Chronic | ICD-10-CM

## 2020-05-15 DIAGNOSIS — Z52.4 KIDNEY DONOR: Chronic | ICD-10-CM

## 2020-05-15 DIAGNOSIS — Z90.49 ACQUIRED ABSENCE OF OTHER SPECIFIED PARTS OF DIGESTIVE TRACT: Chronic | ICD-10-CM

## 2020-05-15 DIAGNOSIS — Z98.891 HISTORY OF UTERINE SCAR FROM PREVIOUS SURGERY: Chronic | ICD-10-CM

## 2020-05-15 DIAGNOSIS — S36.00XA UNSPECIFIED INJURY OF SPLEEN, INITIAL ENCOUNTER: Chronic | ICD-10-CM

## 2020-05-15 LAB
GAS PNL BLDA: SIGNIFICANT CHANGE UP

## 2020-05-15 PROCEDURE — 88342 IMHCHEM/IMCYTCHM 1ST ANTB: CPT | Mod: 26

## 2020-05-15 PROCEDURE — 32668 THORACOSCOPY W/W RESECT DIAG: CPT

## 2020-05-15 PROCEDURE — 88312 SPECIAL STAINS GROUP 1: CPT | Mod: 26

## 2020-05-15 PROCEDURE — 88341 IMHCHEM/IMCYTCHM EA ADD ANTB: CPT | Mod: 26

## 2020-05-15 PROCEDURE — 32663 THORACOSCOPY W/LOBECTOMY: CPT

## 2020-05-15 PROCEDURE — 31645 BRNCHSC W/THER ASPIR 1ST: CPT

## 2020-05-15 PROCEDURE — ZZZZZ: CPT

## 2020-05-15 PROCEDURE — 88309 TISSUE EXAM BY PATHOLOGIST: CPT | Mod: 26

## 2020-05-15 PROCEDURE — 32674 THORACOSCOPY LYMPH NODE EXC: CPT

## 2020-05-15 PROCEDURE — 71045 X-RAY EXAM CHEST 1 VIEW: CPT | Mod: 26

## 2020-05-15 PROCEDURE — 88307 TISSUE EXAM BY PATHOLOGIST: CPT | Mod: 26

## 2020-05-15 RX ORDER — ACETAMINOPHEN 500 MG
1000 TABLET ORAL ONCE
Refills: 0 | Status: COMPLETED | OUTPATIENT
Start: 2020-05-15 | End: 2020-05-16

## 2020-05-15 RX ORDER — ONDANSETRON 8 MG/1
4 TABLET, FILM COATED ORAL EVERY 6 HOURS
Refills: 0 | Status: DISCONTINUED | OUTPATIENT
Start: 2020-05-15 | End: 2020-05-18

## 2020-05-15 RX ORDER — CEFAZOLIN SODIUM 1 G
2000 VIAL (EA) INJECTION EVERY 8 HOURS
Refills: 0 | Status: COMPLETED | OUTPATIENT
Start: 2020-05-15 | End: 2020-05-16

## 2020-05-15 RX ORDER — SODIUM CHLORIDE 9 MG/ML
1000 INJECTION, SOLUTION INTRAVENOUS
Refills: 0 | Status: DISCONTINUED | OUTPATIENT
Start: 2020-05-15 | End: 2020-05-19

## 2020-05-15 RX ORDER — BUPIVACAINE 13.3 MG/ML
20 INJECTION, SUSPENSION, LIPOSOMAL INFILTRATION ONCE
Refills: 0 | Status: DISCONTINUED | OUTPATIENT
Start: 2020-05-15 | End: 2020-05-17

## 2020-05-15 RX ORDER — MORPHINE SULFATE 50 MG/1
2 CAPSULE, EXTENDED RELEASE ORAL EVERY 4 HOURS
Refills: 0 | Status: DISCONTINUED | OUTPATIENT
Start: 2020-05-15 | End: 2020-05-15

## 2020-05-15 RX ORDER — NALOXONE HYDROCHLORIDE 4 MG/.1ML
0.1 SPRAY NASAL
Refills: 0 | Status: DISCONTINUED | OUTPATIENT
Start: 2020-05-15 | End: 2020-05-17

## 2020-05-15 RX ORDER — HYDROMORPHONE HYDROCHLORIDE 2 MG/ML
30 INJECTION INTRAMUSCULAR; INTRAVENOUS; SUBCUTANEOUS
Refills: 0 | Status: DISCONTINUED | OUTPATIENT
Start: 2020-05-15 | End: 2020-05-17

## 2020-05-15 RX ORDER — METOPROLOL TARTRATE 50 MG
5 TABLET ORAL EVERY 6 HOURS
Refills: 0 | Status: DISCONTINUED | OUTPATIENT
Start: 2020-05-15 | End: 2020-05-16

## 2020-05-15 RX ORDER — HEPARIN SODIUM 5000 [USP'U]/ML
5000 INJECTION INTRAVENOUS; SUBCUTANEOUS EVERY 8 HOURS
Refills: 0 | Status: DISCONTINUED | OUTPATIENT
Start: 2020-05-15 | End: 2020-05-19

## 2020-05-15 RX ADMIN — Medication 5 MILLIGRAM(S): at 18:07

## 2020-05-15 RX ADMIN — Medication 100 MILLIGRAM(S): at 21:18

## 2020-05-15 RX ADMIN — SODIUM CHLORIDE 50 MILLILITER(S): 9 INJECTION, SOLUTION INTRAVENOUS at 15:05

## 2020-05-15 RX ADMIN — HYDROMORPHONE HYDROCHLORIDE 30 MILLILITER(S): 2 INJECTION INTRAMUSCULAR; INTRAVENOUS; SUBCUTANEOUS at 15:02

## 2020-05-15 RX ADMIN — HEPARIN SODIUM 5000 UNIT(S): 5000 INJECTION INTRAVENOUS; SUBCUTANEOUS at 21:19

## 2020-05-15 NOTE — BRIEF OPERATIVE NOTE - NSICDXBRIEFPROCEDURE_GEN_ALL_CORE_FT
PROCEDURES:  Thoracoscopy, with lobectomy of single lobe of lung 15-May-2020 14:40:14 bronchoscopy and left upper lobe wedge resection, with completion lobectomy and mediastinal lymph node dissection Al Pacheco

## 2020-05-15 NOTE — BRIEF OPERATIVE NOTE - OPERATION/FINDINGS
moderate scar tissue, especially at inferior chest.  hilum appeared to have some degree of scarring.  anatomic variance as expected, which allowed sampling of left station 4LN but no true AP window.  small chyle leak at anterior hilum, stopped and unable to localize so did a clipping of the tissue here and bioglue.

## 2020-05-16 LAB
ANION GAP SERPL CALC-SCNC: 12 MMOL/L — SIGNIFICANT CHANGE UP (ref 7–14)
BASOPHILS # BLD AUTO: 0.05 K/UL — SIGNIFICANT CHANGE UP (ref 0–0.2)
BASOPHILS NFR BLD AUTO: 0.3 % — SIGNIFICANT CHANGE UP (ref 0–1)
BUN SERPL-MCNC: 13 MG/DL — SIGNIFICANT CHANGE UP (ref 10–20)
CALCIUM SERPL-MCNC: 9 MG/DL — SIGNIFICANT CHANGE UP (ref 8.5–10.1)
CHLORIDE SERPL-SCNC: 101 MMOL/L — SIGNIFICANT CHANGE UP (ref 98–110)
CO2 SERPL-SCNC: 22 MMOL/L — SIGNIFICANT CHANGE UP (ref 17–32)
CREAT SERPL-MCNC: 0.7 MG/DL — SIGNIFICANT CHANGE UP (ref 0.7–1.5)
EOSINOPHIL # BLD AUTO: 0.01 K/UL — SIGNIFICANT CHANGE UP (ref 0–0.7)
EOSINOPHIL NFR BLD AUTO: 0.1 % — SIGNIFICANT CHANGE UP (ref 0–8)
GAS PNL BLDA: SIGNIFICANT CHANGE UP
GLUCOSE SERPL-MCNC: 121 MG/DL — HIGH (ref 70–99)
HCT VFR BLD CALC: 36.2 % — LOW (ref 37–47)
HGB BLD-MCNC: 12.3 G/DL — SIGNIFICANT CHANGE UP (ref 12–16)
IMM GRANULOCYTES NFR BLD AUTO: 0.3 % — SIGNIFICANT CHANGE UP (ref 0.1–0.3)
LYMPHOCYTES # BLD AUTO: 25.2 % — SIGNIFICANT CHANGE UP (ref 20.5–51.1)
LYMPHOCYTES # BLD AUTO: 3.88 K/UL — HIGH (ref 1.2–3.4)
MCHC RBC-ENTMCNC: 33.6 PG — HIGH (ref 27–31)
MCHC RBC-ENTMCNC: 34 G/DL — SIGNIFICANT CHANGE UP (ref 32–37)
MCV RBC AUTO: 98.9 FL — SIGNIFICANT CHANGE UP (ref 81–99)
MONOCYTES # BLD AUTO: 1.29 K/UL — HIGH (ref 0.1–0.6)
MONOCYTES NFR BLD AUTO: 8.4 % — SIGNIFICANT CHANGE UP (ref 1.7–9.3)
NEUTROPHILS # BLD AUTO: 10.13 K/UL — HIGH (ref 1.4–6.5)
NEUTROPHILS NFR BLD AUTO: 65.7 % — SIGNIFICANT CHANGE UP (ref 42.2–75.2)
NRBC # BLD: 0 /100 WBCS — SIGNIFICANT CHANGE UP (ref 0–0)
PLATELET # BLD AUTO: 337 K/UL — SIGNIFICANT CHANGE UP (ref 130–400)
POTASSIUM SERPL-MCNC: 4.9 MMOL/L — SIGNIFICANT CHANGE UP (ref 3.5–5)
POTASSIUM SERPL-SCNC: 4.9 MMOL/L — SIGNIFICANT CHANGE UP (ref 3.5–5)
RBC # BLD: 3.66 M/UL — LOW (ref 4.2–5.4)
RBC # FLD: 13.7 % — SIGNIFICANT CHANGE UP (ref 11.5–14.5)
SODIUM SERPL-SCNC: 135 MMOL/L — SIGNIFICANT CHANGE UP (ref 135–146)
WBC # BLD: 15.41 K/UL — HIGH (ref 4.8–10.8)
WBC # FLD AUTO: 15.41 K/UL — HIGH (ref 4.8–10.8)

## 2020-05-16 PROCEDURE — 71045 X-RAY EXAM CHEST 1 VIEW: CPT | Mod: 26

## 2020-05-16 PROCEDURE — 99233 SBSQ HOSP IP/OBS HIGH 50: CPT

## 2020-05-16 RX ORDER — ACETAMINOPHEN 500 MG
1000 TABLET ORAL ONCE
Refills: 0 | Status: COMPLETED | OUTPATIENT
Start: 2020-05-16 | End: 2020-05-16

## 2020-05-16 RX ORDER — METOPROLOL TARTRATE 50 MG
25 TABLET ORAL
Refills: 0 | Status: DISCONTINUED | OUTPATIENT
Start: 2020-05-16 | End: 2020-05-19

## 2020-05-16 RX ORDER — SIMETHICONE 80 MG/1
80 TABLET, CHEWABLE ORAL EVERY 8 HOURS
Refills: 0 | Status: COMPLETED | OUTPATIENT
Start: 2020-05-16 | End: 2020-05-18

## 2020-05-16 RX ORDER — FAMOTIDINE 10 MG/ML
20 INJECTION INTRAVENOUS
Refills: 0 | Status: DISCONTINUED | OUTPATIENT
Start: 2020-05-16 | End: 2020-05-19

## 2020-05-16 RX ORDER — BUPROPION HYDROCHLORIDE 150 MG/1
150 TABLET, EXTENDED RELEASE ORAL DAILY
Refills: 0 | Status: DISCONTINUED | OUTPATIENT
Start: 2020-05-16 | End: 2020-05-19

## 2020-05-16 RX ADMIN — Medication 400 MILLIGRAM(S): at 11:48

## 2020-05-16 RX ADMIN — SIMETHICONE 80 MILLIGRAM(S): 80 TABLET, CHEWABLE ORAL at 21:24

## 2020-05-16 RX ADMIN — Medication 5 MILLIGRAM(S): at 05:56

## 2020-05-16 RX ADMIN — HEPARIN SODIUM 5000 UNIT(S): 5000 INJECTION INTRAVENOUS; SUBCUTANEOUS at 05:54

## 2020-05-16 RX ADMIN — Medication 100 MILLIGRAM(S): at 13:58

## 2020-05-16 RX ADMIN — BUPROPION HYDROCHLORIDE 150 MILLIGRAM(S): 150 TABLET, EXTENDED RELEASE ORAL at 11:06

## 2020-05-16 RX ADMIN — Medication 100 MILLIGRAM(S): at 05:55

## 2020-05-16 RX ADMIN — SIMETHICONE 80 MILLIGRAM(S): 80 TABLET, CHEWABLE ORAL at 11:07

## 2020-05-16 RX ADMIN — FAMOTIDINE 20 MILLIGRAM(S): 10 INJECTION INTRAVENOUS at 18:14

## 2020-05-16 RX ADMIN — Medication 400 MILLIGRAM(S): at 18:40

## 2020-05-16 RX ADMIN — HEPARIN SODIUM 5000 UNIT(S): 5000 INJECTION INTRAVENOUS; SUBCUTANEOUS at 13:58

## 2020-05-16 RX ADMIN — HEPARIN SODIUM 5000 UNIT(S): 5000 INJECTION INTRAVENOUS; SUBCUTANEOUS at 21:24

## 2020-05-16 RX ADMIN — Medication 25 MILLIGRAM(S): at 18:14

## 2020-05-16 NOTE — PROGRESS NOTE ADULT - ASSESSMENT
Assessment: Patient is a 48 y/o F with PMHx of COPD, hiatal hernia, obesity, s/p VATS left upper lobectomy on 5/15.    Plan:  - Continue chest tube to suction  - Daily chest xray  - Monitor chest tube out put  - Monitor for airleaks  - Monitor O2 saturation  - Pain management Assessment: Patient is a 48 y/o F with PMHx of COPD, hiatal hernia, obesity, s/p VATS left upper lobectomy on 5/15.    Plan:  - Continue chest tube to suction  - Daily chest xray  - Monitor chest tube out put  - Monitor for airleaks  - Monitor O2 saturation  - Pain management  - f/u pathology

## 2020-05-16 NOTE — PROGRESS NOTE ADULT - SUBJECTIVE AND OBJECTIVE BOX
GENERAL SURGERY PROGRESS NOTE     ALONDRA FISCHER  49y  Female  Hospital day :1d  POD:  Procedure: Thoracoscopy, with lobectomy of single lobe of lung    OVERNIGHT EVENTS: No acute events overnight. CT has sanguinous output.     T(F): 99 (05-15-20 @ 20:13), Max: 99 (05-15-20 @ 20:13)  HR: 88 (05-15-20 @ 23:00) (80 - 97)  BP: 123/78 (05-15-20 @ 20:13) (123/78 - 130/68)  ABP: 127/71 (05-15-20 @ 23:00) (94/70 - 145/84)  ABP(mean): 92 (05-15-20 @ 23:00) (84 - 109)  RR: 28 (05-15-20 @ 23:00) (12 - 28)  SpO2: 96% (05-15-20 @ 23:00) (96% - 100%)    DIET/FLUIDS: dextrose 5% + sodium chloride 0.45%. 1000 milliLiter(s) IV Continuous <Continuous>    URINE:    Indwelling Urethral Catheter:     Connect To:  Straight Drainage/Gravity    Indication:  Urine Output Monitoring in Critically Ill (05-15-20 @ 14:42)    GI proph:    AC/ proph: heparin   Injectable 5000 Unit(s) SubCutaneous every 8 hours    ABx: ceFAZolin   IVPB 2000 milliGRAM(s) IV Intermittent every 8 hours      PHYSICAL EXAM:  GENERAL: NAD, well-appearing  CHEST/LUNG: L CT in place. Clear to auscultation bilaterally  HEART: Regular rate and rhythm  ABDOMEN: Soft, Nontender, Nondistended;   EXTREMITIES:  No clubbing, cyanosis, or edema      LABS  ALONDRA FISCHER    T(F): 99, Max: 99 (20:13)  HR: 88 (80 - 97)  BP: 123/78 (123/78 - 130/68)  RR: 28 (12 - 28)  SpO2: 96% (96% - 100%)    7.32/46/213/100/24/-2.3/--  05-15-20 @ 16:00      Diet, Clear Liquid (05-15-20 @ 13:45)    dextrose 5% + sodium chloride 0.45%. (50 mL/Hr)      05-15-20  -  05-16-20  --------------------------------------------------------  OUT:    Chest Tube: 230 mL    Indwelling Catheter - Urethral: 760 mL  Total OUT: 990 mL      Blood Gas Arterial, Lactate: 2.4 mmoL/L (05-15-20 @ 16:00)  Blood Gas Arterial, Lactate: 2.7 mmoL/L (05-15-20 @ 15:02)    ABG - ( 15 May 2020 16:00 )  pH: 7.32  /  pCO2: 46    /  pO2: 213   / HCO3: 24    / Base Excess: -2.3  /  SaO2: 100             ABG - ( 15 May 2020 15:02 )  pH: 7.30  /  pCO2: 48    /  pO2: 154   / HCO3: 24    / Base Excess: -3.0  /  SaO2: 99            RADIOLOGY & ADDITIONAL TESTS:  < from: Xray Chest 1 View AP/PA (05.15.20 @ 14:31) >  Impression:    No radiographic evidence of acute cardiopulmonary disease.  < end of copied text > GENERAL SURGERY PROGRESS NOTE     ALONDRA FISCHER  49y  Female  Hospital day :1d  POD:  Procedure: Thoracoscopy, with lobectomy of single lobe of lung    OVERNIGHT EVENTS: No acute events overnight. CT has sanguinous output. Patient admits to having mild pain.     T(F): 99 (05-15-20 @ 20:13), Max: 99 (05-15-20 @ 20:13)  HR: 88 (05-15-20 @ 23:00) (80 - 97)  BP: 123/78 (05-15-20 @ 20:13) (123/78 - 130/68)  ABP: 127/71 (05-15-20 @ 23:00) (94/70 - 145/84)  ABP(mean): 92 (05-15-20 @ 23:00) (84 - 109)  RR: 28 (05-15-20 @ 23:00) (12 - 28)  SpO2: 96% (05-15-20 @ 23:00) (96% - 100%)    DIET/FLUIDS: dextrose 5% + sodium chloride 0.45%. 1000 milliLiter(s) IV Continuous <Continuous>    URINE:    Indwelling Urethral Catheter:     Connect To:  Straight Drainage/Gravity    Indication:  Urine Output Monitoring in Critically Ill (05-15-20 @ 14:42)    GI proph:    AC/ proph: heparin   Injectable 5000 Unit(s) SubCutaneous every 8 hours    ABx: ceFAZolin   IVPB 2000 milliGRAM(s) IV Intermittent every 8 hours      PHYSICAL EXAM:  GENERAL: NAD, well-appearing  CHEST/LUNG: L CT in place. Clear to auscultation bilaterally  HEART: Regular rate and rhythm  ABDOMEN: Soft, Nontender, Nondistended;   EXTREMITIES:  No clubbing, cyanosis, or edema      LABS  ALONDRA FISCHER    T(F): 99, Max: 99 (20:13)  HR: 88 (80 - 97)  BP: 123/78 (123/78 - 130/68)  RR: 28 (12 - 28)  SpO2: 96% (96% - 100%)    7.32/46/213/100/24/-2.3/--  05-15-20 @ 16:00      Diet, Clear Liquid (05-15-20 @ 13:45)    dextrose 5% + sodium chloride 0.45%. (50 mL/Hr)      05-15-20  -  05-16-20  --------------------------------------------------------  OUT:    Chest Tube: 230 mL    Indwelling Catheter - Urethral: 760 mL  Total OUT: 990 mL      Blood Gas Arterial, Lactate: 2.4 mmoL/L (05-15-20 @ 16:00)  Blood Gas Arterial, Lactate: 2.7 mmoL/L (05-15-20 @ 15:02)    ABG - ( 15 May 2020 16:00 )  pH: 7.32  /  pCO2: 46    /  pO2: 213   / HCO3: 24    / Base Excess: -2.3  /  SaO2: 100             ABG - ( 15 May 2020 15:02 )  pH: 7.30  /  pCO2: 48    /  pO2: 154   / HCO3: 24    / Base Excess: -3.0  /  SaO2: 99            RADIOLOGY & ADDITIONAL TESTS:  < from: Xray Chest 1 View AP/PA (05.15.20 @ 14:31) >  Impression:    No radiographic evidence of acute cardiopulmonary disease.  < end of copied text >
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
## 2020-05-16 NOTE — PROGRESS NOTE ADULT - ASSESSMENT
PROBLEMS:  I spent 45 minutes examining patient, reviewing vitals, labs, medications, imaging and discussing with the team goals of care to prevent life-threatening in this patient who is at high risk for deterioration or death due to:    1.	GEORGE lung cancer - s/p GEORGE lobectomy; keep CT in, Pain control with PCA pump, will add oxycodone after chest tube removal  2.	COPD - recomend adding simbicort 160/4.5 2 puffs BID  3.	Abdominal distention - Ambulate, add simeticone 80 q 8, possibly reglan  4.	smoking cessation - continue bupropion  5.	change IV lopressor to 25 gm q 12  6.	D/c A. line and Teague  7.	Add pepcid PO 20 q 12 and continue SC heparin     PLAN  Neuro: move all 4 extremities. no sensory or motor deficits  Pain management.   acetaminophen  IV Intermittent - Peds. 1000 milliGRAM(s) IV Intermittent once  buPROPion XL . 150 milliGRAM(s) Oral daily  HYDROmorphone PCA (1 mG/mL) 30 milliLiter(s) PCA Continuous PCA Continuous  ondansetron Injectable 4 milliGRAM(s) IV Push every 6 hours PRN    Pulm: Wean off supplemental oxygen as able. Daily CXR. Encourage coughing, deep breathing and use of incentive spirometry.     Cardio: Monitor telemetry/alarms. Continue supportive care   metoprolol tartrate Injectable 5 milliGRAM(s) IV Push every 6 hours    GI: Continue stool softeners.    famotidine    Tablet 20 milliGRAM(s) Oral two times a day  simethicone 80 milliGRAM(s) Chew every 8 hours    Nutrition: Continue present diet  Endocrine and glucose control: observe    Renal: monitor urine output, supplement electrolytes as needed,     Vasc: Heparin SC and/or SCDs for DVT prophylaxis  heparin   Injectable 5000 Unit(s) SubCutaneous every 8 hours    ID: Stable, no fever , no chills. Off antibiotics.  ceFAZolin   IVPB 2000 milliGRAM(s) IV Intermittent every 8 hours - stop after 3 doses    Tubes: Monitor Chest tube output  Therapy: OOB/ambulate  Disposition: start planing discharge home or placement    Pertinent clinical, laboratory, radiographic, hemodynamic, echocardiographic, respiratory data, microbiologic data and chart were reviewed and analyzed frequently throughout the course of the day and night. GI and DVT prophylaxis, glycemic control, head of bed elevation and skin care issues were addressed.  Patient seen, examined and plan discussed with CT Surgery / CTICU team during rounds.    [ ] The patient remains in critical and unstable condition, and requires ICU care and monitoring  [x ] The patient is improving but requires continued monitoring and adjustment of therapy

## 2020-05-17 PROCEDURE — 71046 X-RAY EXAM CHEST 2 VIEWS: CPT | Mod: 26

## 2020-05-17 PROCEDURE — 71046 X-RAY EXAM CHEST 2 VIEWS: CPT | Mod: 26,77

## 2020-05-17 PROCEDURE — 71045 X-RAY EXAM CHEST 1 VIEW: CPT | Mod: 26,59

## 2020-05-17 RX ORDER — OXYCODONE HYDROCHLORIDE 5 MG/1
5 TABLET ORAL EVERY 4 HOURS
Refills: 0 | Status: DISCONTINUED | OUTPATIENT
Start: 2020-05-17 | End: 2020-05-19

## 2020-05-17 RX ORDER — OXYCODONE HYDROCHLORIDE 5 MG/1
10 TABLET ORAL EVERY 4 HOURS
Refills: 0 | Status: DISCONTINUED | OUTPATIENT
Start: 2020-05-17 | End: 2020-05-19

## 2020-05-17 RX ADMIN — HEPARIN SODIUM 5000 UNIT(S): 5000 INJECTION INTRAVENOUS; SUBCUTANEOUS at 21:09

## 2020-05-17 RX ADMIN — HEPARIN SODIUM 5000 UNIT(S): 5000 INJECTION INTRAVENOUS; SUBCUTANEOUS at 13:50

## 2020-05-17 RX ADMIN — SIMETHICONE 80 MILLIGRAM(S): 80 TABLET, CHEWABLE ORAL at 06:21

## 2020-05-17 RX ADMIN — SIMETHICONE 80 MILLIGRAM(S): 80 TABLET, CHEWABLE ORAL at 13:50

## 2020-05-17 RX ADMIN — Medication 25 MILLIGRAM(S): at 18:19

## 2020-05-17 RX ADMIN — Medication 25 MILLIGRAM(S): at 06:21

## 2020-05-17 RX ADMIN — BUPROPION HYDROCHLORIDE 150 MILLIGRAM(S): 150 TABLET, EXTENDED RELEASE ORAL at 11:25

## 2020-05-17 RX ADMIN — FAMOTIDINE 20 MILLIGRAM(S): 10 INJECTION INTRAVENOUS at 18:19

## 2020-05-17 RX ADMIN — HEPARIN SODIUM 5000 UNIT(S): 5000 INJECTION INTRAVENOUS; SUBCUTANEOUS at 06:21

## 2020-05-17 RX ADMIN — OXYCODONE HYDROCHLORIDE 10 MILLIGRAM(S): 5 TABLET ORAL at 20:07

## 2020-05-17 RX ADMIN — SIMETHICONE 80 MILLIGRAM(S): 80 TABLET, CHEWABLE ORAL at 21:09

## 2020-05-17 RX ADMIN — OXYCODONE HYDROCHLORIDE 10 MILLIGRAM(S): 5 TABLET ORAL at 16:37

## 2020-05-17 RX ADMIN — FAMOTIDINE 20 MILLIGRAM(S): 10 INJECTION INTRAVENOUS at 06:21
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
## 2020-05-17 NOTE — PROGRESS NOTE ADULT - ASSESSMENT
Assessment: Patient is a 50 y/o F with PMHx of COPD, hiatal hernia, obesity, s/p VATS left upper lobectomy on 5/15.    Plan:  - Continue chest tube to suction  - Daily chest xray  - Monitor chest tube out put  - Monitor for airleaks  - Monitor O2 saturation  - Pain management  - f/u pathology

## 2020-05-17 NOTE — PROGRESS NOTE ADULT - ASSESSMENT
Daily chest xray   heparin subq   pain management   incentive spirometry   regular diet   encourage ambulation

## 2020-05-17 NOTE — CHART NOTE - NSCHARTNOTEFT_GEN_A_CORE
Chest tube removed, PCA pump stopped, post cxr revealed hydro- pneumo, with gastric bubble. Resume 3L nasal canulla, sat 97%and repeat CXR at 5:30. Films reviewed with attendings. patient without complaints

## 2020-05-18 LAB — SURGICAL PATHOLOGY STUDY: SIGNIFICANT CHANGE UP

## 2020-05-18 PROCEDURE — 71045 X-RAY EXAM CHEST 1 VIEW: CPT | Mod: 26

## 2020-05-18 RX ORDER — LACTULOSE 10 G/15ML
10 SOLUTION ORAL DAILY
Refills: 0 | Status: COMPLETED | OUTPATIENT
Start: 2020-05-18 | End: 2020-05-19

## 2020-05-18 RX ORDER — ACETAMINOPHEN 500 MG
975 TABLET ORAL EVERY 6 HOURS
Refills: 0 | Status: DISCONTINUED | OUTPATIENT
Start: 2020-05-18 | End: 2020-05-19

## 2020-05-18 RX ORDER — FUROSEMIDE 40 MG
40 TABLET ORAL ONCE
Refills: 0 | Status: COMPLETED | OUTPATIENT
Start: 2020-05-18 | End: 2020-05-18

## 2020-05-18 RX ORDER — FUROSEMIDE 40 MG
20 TABLET ORAL ONCE
Refills: 0 | Status: DISCONTINUED | OUTPATIENT
Start: 2020-05-18 | End: 2020-05-18

## 2020-05-18 RX ADMIN — OXYCODONE HYDROCHLORIDE 10 MILLIGRAM(S): 5 TABLET ORAL at 00:00

## 2020-05-18 RX ADMIN — HEPARIN SODIUM 5000 UNIT(S): 5000 INJECTION INTRAVENOUS; SUBCUTANEOUS at 13:21

## 2020-05-18 RX ADMIN — Medication 25 MILLIGRAM(S): at 17:41

## 2020-05-18 RX ADMIN — FAMOTIDINE 20 MILLIGRAM(S): 10 INJECTION INTRAVENOUS at 06:46

## 2020-05-18 RX ADMIN — FAMOTIDINE 20 MILLIGRAM(S): 10 INJECTION INTRAVENOUS at 17:41

## 2020-05-18 RX ADMIN — SIMETHICONE 80 MILLIGRAM(S): 80 TABLET, CHEWABLE ORAL at 06:47

## 2020-05-18 RX ADMIN — Medication 10 MILLIGRAM(S): at 17:40

## 2020-05-18 RX ADMIN — Medication 40 MILLIGRAM(S): at 08:42

## 2020-05-18 RX ADMIN — Medication 10 MILLIGRAM(S): at 11:54

## 2020-05-18 RX ADMIN — HEPARIN SODIUM 5000 UNIT(S): 5000 INJECTION INTRAVENOUS; SUBCUTANEOUS at 22:08

## 2020-05-18 RX ADMIN — Medication 975 MILLIGRAM(S): at 17:41

## 2020-05-18 RX ADMIN — BUPROPION HYDROCHLORIDE 150 MILLIGRAM(S): 150 TABLET, EXTENDED RELEASE ORAL at 11:53

## 2020-05-18 RX ADMIN — HEPARIN SODIUM 5000 UNIT(S): 5000 INJECTION INTRAVENOUS; SUBCUTANEOUS at 06:47

## 2020-05-18 RX ADMIN — Medication 975 MILLIGRAM(S): at 11:54

## 2020-05-18 RX ADMIN — OXYCODONE HYDROCHLORIDE 10 MILLIGRAM(S): 5 TABLET ORAL at 08:42

## 2020-05-18 RX ADMIN — OXYCODONE HYDROCHLORIDE 5 MILLIGRAM(S): 5 TABLET ORAL at 18:48

## 2020-05-18 RX ADMIN — LACTULOSE 10 GRAM(S): 10 SOLUTION ORAL at 11:54

## 2020-05-18 RX ADMIN — Medication 25 MILLIGRAM(S): at 06:46

## 2020-05-18 RX ADMIN — OXYCODONE HYDROCHLORIDE 10 MILLIGRAM(S): 5 TABLET ORAL at 04:45

## 2020-05-18 RX ADMIN — OXYCODONE HYDROCHLORIDE 10 MILLIGRAM(S): 5 TABLET ORAL at 13:22

## 2020-05-18 NOTE — ANESTHESIA FOLLOW-UP NOTE - NSEVALATIONFT_GEN_ALL_CORE
Pt seen and evaluated at the bedside. She endorses having adequate pain control on current regimen. She denies any nausea/vomiting or sore throat. Neurologically intact, rest of care by CTU team.
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
## 2020-05-18 NOTE — CHART NOTE - NSCHARTNOTEFT_GEN_A_CORE
Patient discussed on AM rounds today, CXR improved from yesterday.  Plan: Lasix 40 mg IV this AM          Ambulate          Wean off nasal O2          laxative          CXR in AM

## 2020-05-19 ENCOUNTER — TRANSCRIPTION ENCOUNTER (OUTPATIENT)
Age: 50
End: 2020-05-19

## 2020-05-19 VITALS
SYSTOLIC BLOOD PRESSURE: 124 MMHG | OXYGEN SATURATION: 96 % | TEMPERATURE: 99 F | RESPIRATION RATE: 16 BRPM | DIASTOLIC BLOOD PRESSURE: 71 MMHG | HEART RATE: 79 BPM

## 2020-05-19 PROCEDURE — 71045 X-RAY EXAM CHEST 1 VIEW: CPT | Mod: 26

## 2020-05-19 RX ORDER — DOCUSATE SODIUM 100 MG
1 CAPSULE ORAL
Qty: 90 | Refills: 0
Start: 2020-05-19

## 2020-05-19 RX ORDER — BUPROPION HYDROCHLORIDE 150 MG/1
1 TABLET, EXTENDED RELEASE ORAL
Qty: 0 | Refills: 0 | DISCHARGE
Start: 2020-05-19

## 2020-05-19 RX ORDER — LACTULOSE 10 G/15ML
15 SOLUTION ORAL ONCE
Refills: 0 | Status: COMPLETED | OUTPATIENT
Start: 2020-05-19 | End: 2020-05-19

## 2020-05-19 RX ORDER — BUPROPION HYDROCHLORIDE 150 MG/1
1 TABLET, EXTENDED RELEASE ORAL
Qty: 0 | Refills: 0 | DISCHARGE

## 2020-05-19 RX ORDER — METOPROLOL TARTRATE 50 MG
1 TABLET ORAL
Qty: 60 | Refills: 0
Start: 2020-05-19

## 2020-05-19 RX ORDER — SENNA PLUS 8.6 MG/1
1 TABLET ORAL
Qty: 60 | Refills: 0
Start: 2020-05-19 | End: 2020-06-17

## 2020-05-19 RX ORDER — OXYCODONE AND ACETAMINOPHEN 5; 325 MG/1; MG/1
1 TABLET ORAL
Qty: 20 | Refills: 0
Start: 2020-05-19

## 2020-05-19 RX ORDER — FAMOTIDINE 10 MG/ML
1 INJECTION INTRAVENOUS
Qty: 60 | Refills: 0
Start: 2020-05-19

## 2020-05-19 RX ADMIN — Medication 25 MILLIGRAM(S): at 05:24

## 2020-05-19 RX ADMIN — FAMOTIDINE 20 MILLIGRAM(S): 10 INJECTION INTRAVENOUS at 05:24

## 2020-05-19 RX ADMIN — BUPROPION HYDROCHLORIDE 150 MILLIGRAM(S): 150 TABLET, EXTENDED RELEASE ORAL at 12:17

## 2020-05-19 RX ADMIN — Medication 975 MILLIGRAM(S): at 00:03

## 2020-05-19 RX ADMIN — Medication 975 MILLIGRAM(S): at 05:24

## 2020-05-19 RX ADMIN — HEPARIN SODIUM 5000 UNIT(S): 5000 INJECTION INTRAVENOUS; SUBCUTANEOUS at 05:24

## 2020-05-19 RX ADMIN — LACTULOSE 15 GRAM(S): 10 SOLUTION ORAL at 10:16

## 2020-05-19 RX ADMIN — Medication 975 MILLIGRAM(S): at 12:17

## 2020-05-19 NOTE — DISCHARGE NOTE PROVIDER - CARE PROVIDER_API CALL
Al Pacheco)  Surgery; Thoracic Surgery  [address] [place] [address], [address]  [place] [address]  Phone: [phone]  Fax: [phone]  Follow Up Time:     [facility] [address] [address]  Phone: [phone]  Fax: [phone]  Follow Up Time:

## 2020-05-19 NOTE — DISCHARGE NOTE NURSING/CASE MANAGEMENT/SOCIAL WORK - NSDCPEFALRISK_GEN_ALL_CORE
Patient information on fall and injury prevention V-Y Plasty Text: The defect edges were debeveled with a #15 scalpel blade.  Given the location of the defect, shape of the defect and the proximity to free margins an V-Y advancement flap was deemed most appropriate.  Using a sterile surgical marker, an appropriate advancement flap was drawn incorporating the defect and placing the expected incisions within the relaxed skin tension lines where possible.    The area thus outlined was incised deep to adipose tissue with a #15 scalpel blade.  The skin margins were undermined to an appropriate distance in all directions utilizing iris scissors.
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
## 2020-05-19 NOTE — DISCHARGE NOTE PROVIDER - NSDCMRMEDTOKEN_GEN_ALL_CORE_FT
buPROPion 150 mg/24 hours (XL) oral tablet, extended release: 1 tab(s) orally once a day  Colace 100 mg oral capsule: 1 cap(s) orally every 8 hours   famotidine 20 mg oral tablet: 1 tab(s) orally 2 times a day  metoprolol tartrate 25 mg oral tablet: 1 tab(s) orally 2 times a day  oxycodone-acetaminophen 5 mg-325 mg oral tablet: 1 tab(s) orally every 6 hours, As Needed -for moderate pain MDD:6  senna oral tablet: 1 tab(s) orally 2 times a day

## 2020-05-19 NOTE — DISCHARGE NOTE PROVIDER - HOSPITAL COURSE
50 yo female presents with diagnosis of left lung mass, pt had lymph node biopsy done 5/8/2020 now is scheduled for thoracoscopy, & "biopsy possible removal of upper lobe"    denies chest pain, palpitations, shortness of breath, dyspnea, or dysuria. exercise tolerance: 2 blocks/ flights of stairs w/o sob ;    denies COVID-19 exposure, denies any symptoms            On 5/15, the patient underwent thoracoscopy, with lobectomy of single lobe of lung, bronchoscopy and left upper lobe wedge resection, with completion lobectomy and mediastinal lymph node dissection.  Postoperatively, the patient developed a hydropneumothorax after the chest tube was removed, which resolved on its own.  She otherwise had an uneventful hospital course and was discharged home in stable condition on POD # 5.

## 2020-05-19 NOTE — DISCHARGE NOTE PROVIDER - NSDCFUADDINST_GEN_ALL_CORE_FT
please avoid any heavy lifting, or pushing; please no driving or sitting in the front seat or sleeping on side x 2 weeks; check temp and weight daily and shower daily

## 2020-05-19 NOTE — DISCHARGE NOTE PROVIDER - CARE PROVIDERS DIRECT ADDRESSES
[email or web address],DirectAddress_Unknown

## 2020-05-19 NOTE — DISCHARGE NOTE PROVIDER - NSDCACTIVITY_GEN_ALL_CORE
Stairs allowed/Walking - Indoors allowed/No heavy lifting/straining/Return to Work/School allowed/Do not drive or operate machinery/Walking - Outdoors allowed/Sex allowed/Showering allowed

## 2020-05-19 NOTE — DISCHARGE NOTE NURSING/CASE MANAGEMENT/SOCIAL WORK - PATIENT PORTAL LINK FT
You can access the FollowMyHealth Patient Portal offered by [facility] by registering at the following website: [email or web address]. By joining [facility]’s FollowMyHealth portal, you will also be able to view your health information using other applications (apps) compatible with our system.

## 2020-05-19 NOTE — PROGRESS NOTE ADULT - ASSESSMENT
discharge today  encourage ambulation   incentive spirometry   follow up with Dr. Pacheco outpt   pain management

## 2020-05-22 DIAGNOSIS — Z90.81 ACQUIRED ABSENCE OF SPLEEN: ICD-10-CM

## 2020-05-22 DIAGNOSIS — Z88.8 ALLERGY STATUS TO OTHER DRUGS, MEDICAMENTS AND BIOLOGICAL SUBSTANCES STATUS: ICD-10-CM

## 2020-05-22 DIAGNOSIS — C34.12 MALIGNANT NEOPLASM OF UPPER LOBE, LEFT BRONCHUS OR LUNG: ICD-10-CM

## 2020-05-22 DIAGNOSIS — J94.8 OTHER SPECIFIED PLEURAL CONDITIONS: ICD-10-CM

## 2020-05-22 DIAGNOSIS — K44.9 DIAPHRAGMATIC HERNIA WITHOUT OBSTRUCTION OR GANGRENE: ICD-10-CM

## 2020-05-22 DIAGNOSIS — Z87.891 PERSONAL HISTORY OF NICOTINE DEPENDENCE: ICD-10-CM

## 2020-05-22 DIAGNOSIS — Z87.898 PERSONAL HISTORY OF OTHER SPECIFIED CONDITIONS: ICD-10-CM

## 2020-05-22 DIAGNOSIS — Z90.49 ACQUIRED ABSENCE OF OTHER SPECIFIED PARTS OF DIGESTIVE TRACT: ICD-10-CM

## 2020-05-22 DIAGNOSIS — R91.8 OTHER NONSPECIFIC ABNORMAL FINDING OF LUNG FIELD: ICD-10-CM

## 2020-05-22 DIAGNOSIS — J98.4 OTHER DISORDERS OF LUNG: ICD-10-CM

## 2020-05-22 DIAGNOSIS — J44.9 CHRONIC OBSTRUCTIVE PULMONARY DISEASE, UNSPECIFIED: ICD-10-CM

## 2020-05-22 DIAGNOSIS — E66.9 OBESITY, UNSPECIFIED: ICD-10-CM

## 2020-05-27 ENCOUNTER — APPOINTMENT (OUTPATIENT)
Dept: CARDIOTHORACIC SURGERY | Facility: CLINIC | Age: 50
End: 2020-05-27
Payer: COMMERCIAL

## 2020-05-27 ENCOUNTER — OUTPATIENT (OUTPATIENT)
Dept: OUTPATIENT SERVICES | Facility: HOSPITAL | Age: 50
LOS: 1 days | Discharge: HOME | End: 2020-05-27
Payer: COMMERCIAL

## 2020-05-27 VITALS
OXYGEN SATURATION: 97 % | HEART RATE: 63 BPM | RESPIRATION RATE: 13 BRPM | SYSTOLIC BLOOD PRESSURE: 148 MMHG | DIASTOLIC BLOOD PRESSURE: 93 MMHG | TEMPERATURE: 98.4 F

## 2020-05-27 DIAGNOSIS — R91.8 OTHER NONSPECIFIC ABNORMAL FINDING OF LUNG FIELD: ICD-10-CM

## 2020-05-27 DIAGNOSIS — Z98.891 HISTORY OF UTERINE SCAR FROM PREVIOUS SURGERY: Chronic | ICD-10-CM

## 2020-05-27 DIAGNOSIS — Z52.4 KIDNEY DONOR: Chronic | ICD-10-CM

## 2020-05-27 DIAGNOSIS — Z98.890 OTHER SPECIFIED POSTPROCEDURAL STATES: Chronic | ICD-10-CM

## 2020-05-27 DIAGNOSIS — Z90.49 ACQUIRED ABSENCE OF OTHER SPECIFIED PARTS OF DIGESTIVE TRACT: Chronic | ICD-10-CM

## 2020-05-27 DIAGNOSIS — S36.00XA UNSPECIFIED INJURY OF SPLEEN, INITIAL ENCOUNTER: Chronic | ICD-10-CM

## 2020-05-27 DIAGNOSIS — Z90.2 ACQUIRED ABSENCE OF LUNG [PART OF]: ICD-10-CM

## 2020-05-27 DIAGNOSIS — Z90.89 ACQUIRED ABSENCE OF OTHER ORGANS: Chronic | ICD-10-CM

## 2020-05-27 PROCEDURE — 71046 X-RAY EXAM CHEST 2 VIEWS: CPT | Mod: 26

## 2020-05-27 PROCEDURE — 99024 POSTOP FOLLOW-UP VISIT: CPT

## 2020-06-16 ENCOUNTER — APPOINTMENT (OUTPATIENT)
Dept: CARDIOTHORACIC SURGERY | Facility: CLINIC | Age: 50
End: 2020-06-16
Payer: COMMERCIAL

## 2020-06-16 DIAGNOSIS — R52 PAIN, UNSPECIFIED: ICD-10-CM

## 2020-06-16 PROCEDURE — 99024 POSTOP FOLLOW-UP VISIT: CPT

## 2020-07-08 DIAGNOSIS — Z87.891 PERSONAL HISTORY OF NICOTINE DEPENDENCE: ICD-10-CM

## 2020-08-10 LAB
ANION GAP SERPL CALC-SCNC: 15 MMOL/L
BUN SERPL-MCNC: 18 MG/DL
CALCIUM SERPL-MCNC: 9.4 MG/DL
CHLORIDE SERPL-SCNC: 105 MMOL/L
CO2 SERPL-SCNC: 24 MMOL/L
CREAT SERPL-MCNC: 0.73 MG/DL
GLUCOSE SERPL-MCNC: 127 MG/DL
POTASSIUM SERPL-SCNC: 4.9 MMOL/L
SODIUM SERPL-SCNC: 143 MMOL/L

## 2020-08-19 ENCOUNTER — OUTPATIENT (OUTPATIENT)
Dept: OUTPATIENT SERVICES | Facility: HOSPITAL | Age: 50
LOS: 1 days | Discharge: HOME | End: 2020-08-19
Payer: COMMERCIAL

## 2020-08-19 ENCOUNTER — RESULT REVIEW (OUTPATIENT)
Age: 50
End: 2020-08-19

## 2020-08-19 DIAGNOSIS — R91.1 SOLITARY PULMONARY NODULE: ICD-10-CM

## 2020-08-19 DIAGNOSIS — Z98.890 OTHER SPECIFIED POSTPROCEDURAL STATES: Chronic | ICD-10-CM

## 2020-08-19 DIAGNOSIS — Z90.2 ACQUIRED ABSENCE OF LUNG [PART OF]: ICD-10-CM

## 2020-08-19 DIAGNOSIS — Z90.49 ACQUIRED ABSENCE OF OTHER SPECIFIED PARTS OF DIGESTIVE TRACT: Chronic | ICD-10-CM

## 2020-08-19 DIAGNOSIS — S36.00XA UNSPECIFIED INJURY OF SPLEEN, INITIAL ENCOUNTER: Chronic | ICD-10-CM

## 2020-08-19 DIAGNOSIS — Z90.89 ACQUIRED ABSENCE OF OTHER ORGANS: Chronic | ICD-10-CM

## 2020-08-19 DIAGNOSIS — Z52.4 KIDNEY DONOR: Chronic | ICD-10-CM

## 2020-08-19 DIAGNOSIS — Z98.891 HISTORY OF UTERINE SCAR FROM PREVIOUS SURGERY: Chronic | ICD-10-CM

## 2020-08-19 PROCEDURE — 71260 CT THORAX DX C+: CPT | Mod: 26

## 2020-08-25 ENCOUNTER — APPOINTMENT (OUTPATIENT)
Dept: CARDIOTHORACIC SURGERY | Facility: CLINIC | Age: 50
End: 2020-08-25
Payer: COMMERCIAL

## 2020-08-25 DIAGNOSIS — Z90.2 ACQUIRED ABSENCE OF LUNG [PART OF]: ICD-10-CM

## 2020-08-25 DIAGNOSIS — Z09 ENCOUNTER FOR FOLLOW-UP EXAMINATION AFTER COMPLETED TREATMENT FOR CONDITIONS OTHER THAN MALIGNANT NEOPLASM: ICD-10-CM

## 2020-08-25 PROCEDURE — 99214 OFFICE O/P EST MOD 30 MIN: CPT | Mod: 95
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
## 2020-11-01 ENCOUNTER — OUTPATIENT (OUTPATIENT)
Dept: OUTPATIENT SERVICES | Facility: HOSPITAL | Age: 50
LOS: 1 days | Discharge: HOME | End: 2020-11-01
Payer: COMMERCIAL

## 2020-11-01 DIAGNOSIS — Z98.891 HISTORY OF UTERINE SCAR FROM PREVIOUS SURGERY: Chronic | ICD-10-CM

## 2020-11-01 DIAGNOSIS — R06.89 OTHER ABNORMALITIES OF BREATHING: ICD-10-CM

## 2020-11-01 DIAGNOSIS — Z52.4 KIDNEY DONOR: Chronic | ICD-10-CM

## 2020-11-01 DIAGNOSIS — Z98.890 OTHER SPECIFIED POSTPROCEDURAL STATES: Chronic | ICD-10-CM

## 2020-11-01 DIAGNOSIS — S36.00XA UNSPECIFIED INJURY OF SPLEEN, INITIAL ENCOUNTER: Chronic | ICD-10-CM

## 2020-11-01 DIAGNOSIS — Z90.49 ACQUIRED ABSENCE OF OTHER SPECIFIED PARTS OF DIGESTIVE TRACT: Chronic | ICD-10-CM

## 2020-11-01 DIAGNOSIS — Z90.89 ACQUIRED ABSENCE OF OTHER ORGANS: Chronic | ICD-10-CM

## 2020-11-01 PROCEDURE — 71046 X-RAY EXAM CHEST 2 VIEWS: CPT | Mod: 26

## 2021-01-02 ENCOUNTER — OUTPATIENT (OUTPATIENT)
Dept: OUTPATIENT SERVICES | Facility: HOSPITAL | Age: 51
LOS: 1 days | Discharge: HOME | End: 2021-01-02
Payer: COMMERCIAL

## 2021-01-02 ENCOUNTER — RESULT REVIEW (OUTPATIENT)
Age: 51
End: 2021-01-02

## 2021-01-02 DIAGNOSIS — Z98.890 OTHER SPECIFIED POSTPROCEDURAL STATES: Chronic | ICD-10-CM

## 2021-01-02 DIAGNOSIS — Z90.49 ACQUIRED ABSENCE OF OTHER SPECIFIED PARTS OF DIGESTIVE TRACT: Chronic | ICD-10-CM

## 2021-01-02 DIAGNOSIS — Z12.31 ENCOUNTER FOR SCREENING MAMMOGRAM FOR MALIGNANT NEOPLASM OF BREAST: ICD-10-CM

## 2021-01-02 DIAGNOSIS — Z90.89 ACQUIRED ABSENCE OF OTHER ORGANS: Chronic | ICD-10-CM

## 2021-01-02 DIAGNOSIS — Z98.891 HISTORY OF UTERINE SCAR FROM PREVIOUS SURGERY: Chronic | ICD-10-CM

## 2021-01-02 DIAGNOSIS — Z52.4 KIDNEY DONOR: Chronic | ICD-10-CM

## 2021-01-02 DIAGNOSIS — S36.00XA UNSPECIFIED INJURY OF SPLEEN, INITIAL ENCOUNTER: Chronic | ICD-10-CM

## 2021-01-02 PROCEDURE — 77067 SCR MAMMO BI INCL CAD: CPT | Mod: 26

## 2021-01-02 PROCEDURE — 77063 BREAST TOMOSYNTHESIS BI: CPT | Mod: 26

## 2021-01-14 ENCOUNTER — NON-APPOINTMENT (OUTPATIENT)
Age: 51
End: 2021-01-14

## 2021-01-22 ENCOUNTER — APPOINTMENT (OUTPATIENT)
Dept: OBGYN | Facility: CLINIC | Age: 51
End: 2021-01-22
Payer: COMMERCIAL

## 2021-01-22 VITALS — SYSTOLIC BLOOD PRESSURE: 122 MMHG | DIASTOLIC BLOOD PRESSURE: 84 MMHG | TEMPERATURE: 98 F

## 2021-01-22 PROCEDURE — 99072 ADDL SUPL MATRL&STAF TM PHE: CPT

## 2021-01-22 PROCEDURE — 99396 PREV VISIT EST AGE 40-64: CPT

## 2021-01-22 NOTE — DISCUSSION/SUMMARY
[FreeTextEntry1] : Pap done\par Self breast exam stressed\par Prescribed yearly bilateral screening mammogram\par Follow-up yearly or as needed

## 2021-01-22 NOTE — PHYSICAL EXAM
[Appropriately responsive] : appropriately responsive [Alert] : alert [No Acute Distress] : no acute distress [No Lymphadenopathy] : no lymphadenopathy [Regular Rate Rhythm] : regular rate rhythm [No Murmurs] : no murmurs [Soft] : soft [Clear to Auscultation B/L] : clear to auscultation bilaterally [Non-tender] : non-tender [Non-distended] : non-distended [No HSM] : No HSM [No Lesions] : no lesions [No Mass] : no mass [Oriented x3] : oriented x3 [Examination Of The Breasts] : a normal appearance [No Masses] : no breast masses were palpable [Labia Majora] : normal [Labia Minora] : normal [Normal] : normal [Uterine Adnexae] : normal

## 2021-01-22 NOTE — HISTORY OF PRESENT ILLNESS
[FreeTextEntry1] : Patient is 50 years old para 1-0-0-1 last menstrual period August 2017\par She has a history of lung cancer status post single lobe lobectomy\par She denies postmenopausal bleeding and is presently without complaints

## 2021-03-20 ENCOUNTER — OUTPATIENT (OUTPATIENT)
Dept: OUTPATIENT SERVICES | Facility: HOSPITAL | Age: 51
LOS: 1 days | Discharge: HOME | End: 2021-03-20
Payer: COMMERCIAL

## 2021-03-20 DIAGNOSIS — Z98.890 OTHER SPECIFIED POSTPROCEDURAL STATES: Chronic | ICD-10-CM

## 2021-03-20 DIAGNOSIS — S36.00XA UNSPECIFIED INJURY OF SPLEEN, INITIAL ENCOUNTER: Chronic | ICD-10-CM

## 2021-03-20 DIAGNOSIS — Z90.89 ACQUIRED ABSENCE OF OTHER ORGANS: Chronic | ICD-10-CM

## 2021-03-20 DIAGNOSIS — R91.1 SOLITARY PULMONARY NODULE: ICD-10-CM

## 2021-03-20 DIAGNOSIS — Z90.49 ACQUIRED ABSENCE OF OTHER SPECIFIED PARTS OF DIGESTIVE TRACT: Chronic | ICD-10-CM

## 2021-03-20 DIAGNOSIS — Z98.891 HISTORY OF UTERINE SCAR FROM PREVIOUS SURGERY: Chronic | ICD-10-CM

## 2021-03-20 DIAGNOSIS — Z52.4 KIDNEY DONOR: Chronic | ICD-10-CM

## 2021-03-20 PROCEDURE — 71250 CT THORAX DX C-: CPT | Mod: 26

## 2021-09-21 NOTE — PROGRESS NOTE ADULT - SUBJECTIVE AND OBJECTIVE BOX
GENERAL SURGERY PROGRESS NOTE     AMADO ALONDRA  49y  Female  Hospital day :1d  POD:  Procedure: Thoracoscopy, with lobectomy of single lobe of lung    OVERNIGHT EVENTS: No acute events overnight. CT has sanguinous output. Patient states pain is improving.     T(F): 99 (05-15-20 @ 20:13), Max: 99 (05-15-20 @ 20:13)  HR: 88 (05-15-20 @ 23:00) (80 - 97)  BP: 123/78 (05-15-20 @ 20:13) (123/78 - 130/68)  ABP: 127/71 (05-15-20 @ 23:00) (94/70 - 145/84)  ABP(mean): 92 (05-15-20 @ 23:00) (84 - 109)  RR: 28 (05-15-20 @ 23:00) (12 - 28)  SpO2: 96% (05-15-20 @ 23:00) (96% - 100%)    DIET/FLUIDS: dextrose 5% + sodium chloride 0.45%. 1000 milliLiter(s) IV Continuous <Continuous>    URINE:    Indwelling Urethral Catheter:     Connect To:  Straight Drainage/Gravity    Indication:  Urine Output Monitoring in Critically Ill (05-15-20 @ 14:42)    GI proph:    AC/ proph: heparin   Injectable 5000 Unit(s) SubCutaneous every 8 hours    ABx: ceFAZolin   IVPB 2000 milliGRAM(s) IV Intermittent every 8 hours      PHYSICAL EXAM:  GENERAL: NAD, well-appearing  CHEST/LUNG: L CT in place. Clear to auscultation bilaterally  HEART: Regular rate and rhythm  ABDOMEN: Soft, Nontender, Nondistended;   EXTREMITIES:  No clubbing, cyanosis, or edema      LABS  ALONDRA FISCHER    T(F): 99, Max: 99 (20:13)  HR: 88 (80 - 97)  BP: 123/78 (123/78 - 130/68)  RR: 28 (12 - 28)  SpO2: 96% (96% - 100%)    7.32/46/213/100/24/-2.3/--  05-15-20 @ 16:00      Diet, Clear Liquid (05-15-20 @ 13:45)    dextrose 5% + sodium chloride 0.45%. (50 mL/Hr)      05-15-20  -  05-16-20  --------------------------------------------------------  OUT:    Chest Tube: 230 mL    Indwelling Catheter - Urethral: 760 mL  Total OUT: 990 mL      Blood Gas Arterial, Lactate: 2.4 mmoL/L (05-15-20 @ 16:00)  Blood Gas Arterial, Lactate: 2.7 mmoL/L (05-15-20 @ 15:02)    ABG - ( 15 May 2020 16:00 )  pH: 7.32  /  pCO2: 46    /  pO2: 213   / HCO3: 24    / Base Excess: -2.3  /  SaO2: 100       ABG - ( 15 May 2020 15:02 )  pH: 7.30  /  pCO2: 48    /  pO2: 154   / HCO3: 24    / Base Excess: -3.0  /  SaO2: 99            RADIOLOGY & ADDITIONAL TESTS:  < from: Xray Chest 1 View- PORTABLE-Routine (05.16.20 @ 06:33) >  Impression:    Bilateral basilar opacities with lower lung volumes. Status post left-sided chest tube in anatomic position.  New apparent air-filled stomach, correlate clinically for gastropathy  < end of copied text >    < from: Xray Chest 1 View AP/PA (05.15.20 @ 14:31) >  Impression:    No radiographic evidence of acute cardiopulmonary disease.  < end of copied text > 58 year old male complaining of SOB while using cleaning products to clean. Reports dizziness. Denies any chest pain, headache, abdominal pain, nausea or vomiting.

## 2021-09-25 ENCOUNTER — OUTPATIENT (OUTPATIENT)
Dept: OUTPATIENT SERVICES | Facility: HOSPITAL | Age: 51
LOS: 1 days | Discharge: HOME | End: 2021-09-25
Payer: COMMERCIAL

## 2021-09-25 DIAGNOSIS — Z90.49 ACQUIRED ABSENCE OF OTHER SPECIFIED PARTS OF DIGESTIVE TRACT: Chronic | ICD-10-CM

## 2021-09-25 DIAGNOSIS — Z90.89 ACQUIRED ABSENCE OF OTHER ORGANS: Chronic | ICD-10-CM

## 2021-09-25 DIAGNOSIS — S36.00XA UNSPECIFIED INJURY OF SPLEEN, INITIAL ENCOUNTER: Chronic | ICD-10-CM

## 2021-09-25 DIAGNOSIS — Z52.4 KIDNEY DONOR: Chronic | ICD-10-CM

## 2021-09-25 DIAGNOSIS — Z98.890 OTHER SPECIFIED POSTPROCEDURAL STATES: Chronic | ICD-10-CM

## 2021-09-25 DIAGNOSIS — F17.211 NICOTINE DEPENDENCE, CIGARETTES, IN REMISSION: ICD-10-CM

## 2021-09-25 DIAGNOSIS — Z98.891 HISTORY OF UTERINE SCAR FROM PREVIOUS SURGERY: Chronic | ICD-10-CM

## 2021-09-25 PROCEDURE — 71250 CT THORAX DX C-: CPT | Mod: 26

## 2022-01-24 ENCOUNTER — APPOINTMENT (OUTPATIENT)
Dept: OBGYN | Facility: CLINIC | Age: 52
End: 2022-01-24
Payer: COMMERCIAL

## 2022-01-24 VITALS — DIASTOLIC BLOOD PRESSURE: 84 MMHG | SYSTOLIC BLOOD PRESSURE: 128 MMHG

## 2022-01-24 PROCEDURE — 99213 OFFICE O/P EST LOW 20 MIN: CPT | Mod: 25

## 2022-01-24 PROCEDURE — 99396 PREV VISIT EST AGE 40-64: CPT

## 2022-01-24 NOTE — HISTORY OF PRESENT ILLNESS
[FreeTextEntry1] : Patient is 51 years old para 1-0-0-1 last menstrual period August 2017.  She denies postmenopausal bleeding.\par She complains of urinary stress incontinence.  Patient also notes vaginal discharge with odor

## 2022-01-24 NOTE — DISCUSSION/SUMMARY
[FreeTextEntry1] : Pap done\par B VV test done\par Prescribed yearly bilateral screening mammogram\par Advise follow-up with urogynecologist\par Follow-up yearly or as needed

## 2022-01-25 DIAGNOSIS — N76.0 ACUTE VAGINITIS: ICD-10-CM

## 2022-01-25 DIAGNOSIS — B96.89 ACUTE VAGINITIS: ICD-10-CM

## 2022-02-07 ENCOUNTER — OUTPATIENT (OUTPATIENT)
Dept: OUTPATIENT SERVICES | Facility: HOSPITAL | Age: 52
LOS: 1 days | Discharge: HOME | End: 2022-02-07
Payer: COMMERCIAL

## 2022-02-07 ENCOUNTER — RESULT REVIEW (OUTPATIENT)
Age: 52
End: 2022-02-07

## 2022-02-07 DIAGNOSIS — Z90.49 ACQUIRED ABSENCE OF OTHER SPECIFIED PARTS OF DIGESTIVE TRACT: Chronic | ICD-10-CM

## 2022-02-07 DIAGNOSIS — Z52.4 KIDNEY DONOR: Chronic | ICD-10-CM

## 2022-02-07 DIAGNOSIS — Z12.31 ENCOUNTER FOR SCREENING MAMMOGRAM FOR MALIGNANT NEOPLASM OF BREAST: ICD-10-CM

## 2022-02-07 DIAGNOSIS — Z90.89 ACQUIRED ABSENCE OF OTHER ORGANS: Chronic | ICD-10-CM

## 2022-02-07 DIAGNOSIS — Z98.890 OTHER SPECIFIED POSTPROCEDURAL STATES: Chronic | ICD-10-CM

## 2022-02-07 DIAGNOSIS — S36.00XA UNSPECIFIED INJURY OF SPLEEN, INITIAL ENCOUNTER: Chronic | ICD-10-CM

## 2022-02-07 DIAGNOSIS — Z98.891 HISTORY OF UTERINE SCAR FROM PREVIOUS SURGERY: Chronic | ICD-10-CM

## 2022-02-07 PROCEDURE — 77067 SCR MAMMO BI INCL CAD: CPT | Mod: 26

## 2022-02-07 PROCEDURE — 77063 BREAST TOMOSYNTHESIS BI: CPT | Mod: 26

## 2022-04-02 ENCOUNTER — OUTPATIENT (OUTPATIENT)
Dept: OUTPATIENT SERVICES | Facility: HOSPITAL | Age: 52
LOS: 1 days | Discharge: HOME | End: 2022-04-02
Payer: COMMERCIAL

## 2022-04-02 DIAGNOSIS — Z90.49 ACQUIRED ABSENCE OF OTHER SPECIFIED PARTS OF DIGESTIVE TRACT: Chronic | ICD-10-CM

## 2022-04-02 DIAGNOSIS — Z98.891 HISTORY OF UTERINE SCAR FROM PREVIOUS SURGERY: Chronic | ICD-10-CM

## 2022-04-02 DIAGNOSIS — R91.8 OTHER NONSPECIFIC ABNORMAL FINDING OF LUNG FIELD: ICD-10-CM

## 2022-04-02 DIAGNOSIS — Z52.4 KIDNEY DONOR: Chronic | ICD-10-CM

## 2022-04-02 DIAGNOSIS — S36.00XA UNSPECIFIED INJURY OF SPLEEN, INITIAL ENCOUNTER: Chronic | ICD-10-CM

## 2022-04-02 DIAGNOSIS — Z98.890 OTHER SPECIFIED POSTPROCEDURAL STATES: Chronic | ICD-10-CM

## 2022-04-02 DIAGNOSIS — Z90.89 ACQUIRED ABSENCE OF OTHER ORGANS: Chronic | ICD-10-CM

## 2022-04-02 PROCEDURE — 71250 CT THORAX DX C-: CPT | Mod: 26

## 2022-08-07 ENCOUNTER — NON-APPOINTMENT (OUTPATIENT)
Age: 52
End: 2022-08-07

## 2022-08-08 ENCOUNTER — TRANSCRIPTION ENCOUNTER (OUTPATIENT)
Age: 52
End: 2022-08-08

## 2022-08-09 ENCOUNTER — APPOINTMENT (OUTPATIENT)
Age: 52
End: 2022-08-09

## 2022-08-09 ENCOUNTER — OUTPATIENT (OUTPATIENT)
Dept: INPATIENT UNIT | Facility: HOSPITAL | Age: 52
LOS: 1 days | Discharge: HOME | End: 2022-08-09

## 2022-08-09 VITALS
SYSTOLIC BLOOD PRESSURE: 131 MMHG | RESPIRATION RATE: 16 BRPM | HEART RATE: 63 BPM | OXYGEN SATURATION: 99 % | DIASTOLIC BLOOD PRESSURE: 89 MMHG | TEMPERATURE: 98 F

## 2022-08-09 VITALS
SYSTOLIC BLOOD PRESSURE: 117 MMHG | DIASTOLIC BLOOD PRESSURE: 79 MMHG | RESPIRATION RATE: 16 BRPM | TEMPERATURE: 98 F | OXYGEN SATURATION: 98 % | HEART RATE: 64 BPM

## 2022-08-09 DIAGNOSIS — Z98.890 OTHER SPECIFIED POSTPROCEDURAL STATES: Chronic | ICD-10-CM

## 2022-08-09 DIAGNOSIS — Z90.89 ACQUIRED ABSENCE OF OTHER ORGANS: Chronic | ICD-10-CM

## 2022-08-09 DIAGNOSIS — Z90.49 ACQUIRED ABSENCE OF OTHER SPECIFIED PARTS OF DIGESTIVE TRACT: Chronic | ICD-10-CM

## 2022-08-09 DIAGNOSIS — Z98.891 HISTORY OF UTERINE SCAR FROM PREVIOUS SURGERY: Chronic | ICD-10-CM

## 2022-08-09 DIAGNOSIS — Z52.4 KIDNEY DONOR: Chronic | ICD-10-CM

## 2022-08-09 DIAGNOSIS — S36.00XA UNSPECIFIED INJURY OF SPLEEN, INITIAL ENCOUNTER: Chronic | ICD-10-CM

## 2022-08-09 DIAGNOSIS — U07.1 COVID-19: ICD-10-CM

## 2022-08-09 RX ORDER — BEBTELOVIMAB 87.5 MG/ML
175 INJECTION, SOLUTION INTRAVENOUS ONCE
Refills: 0 | Status: COMPLETED | OUTPATIENT
Start: 2022-08-09 | End: 2022-08-09

## 2022-08-09 RX ADMIN — BEBTELOVIMAB 175 MILLIGRAM(S): 87.5 INJECTION, SOLUTION INTRAVENOUS at 08:55

## 2022-08-09 NOTE — MONOCLONAL ANTIBODY INFUSION - HOME MEDICATIONS
Colace 100 mg oral capsule , 1 cap(s) orally every 8 hours   senna oral tablet , 1 tab(s) orally 2 times a day   oxycodone-acetaminophen 5 mg-325 mg oral tablet , 1 tab(s) orally every 6 hours, As Needed -for moderate pain MDD:6  famotidine 20 mg oral tablet , 1 tab(s) orally 2 times a day  metoprolol tartrate 25 mg oral tablet , 1 tab(s) orally 2 times a day  buPROPion 150 mg/24 hours (XL) oral tablet, extended release , 1 tab(s) orally once a day

## 2022-08-10 ENCOUNTER — TRANSCRIPTION ENCOUNTER (OUTPATIENT)
Age: 52
End: 2022-08-10

## 2022-10-23 ENCOUNTER — OUTPATIENT (OUTPATIENT)
Dept: OUTPATIENT SERVICES | Facility: HOSPITAL | Age: 52
LOS: 1 days | Discharge: HOME | End: 2022-10-23

## 2022-10-23 DIAGNOSIS — S36.00XA UNSPECIFIED INJURY OF SPLEEN, INITIAL ENCOUNTER: Chronic | ICD-10-CM

## 2022-10-23 DIAGNOSIS — Z90.49 ACQUIRED ABSENCE OF OTHER SPECIFIED PARTS OF DIGESTIVE TRACT: Chronic | ICD-10-CM

## 2022-10-23 DIAGNOSIS — Z98.890 OTHER SPECIFIED POSTPROCEDURAL STATES: Chronic | ICD-10-CM

## 2022-10-23 DIAGNOSIS — R91.1 SOLITARY PULMONARY NODULE: ICD-10-CM

## 2022-10-23 DIAGNOSIS — Z52.4 KIDNEY DONOR: Chronic | ICD-10-CM

## 2022-10-23 DIAGNOSIS — Z90.89 ACQUIRED ABSENCE OF OTHER ORGANS: Chronic | ICD-10-CM

## 2022-10-23 DIAGNOSIS — Z98.891 HISTORY OF UTERINE SCAR FROM PREVIOUS SURGERY: Chronic | ICD-10-CM

## 2022-10-23 PROCEDURE — 71250 CT THORAX DX C-: CPT | Mod: 26

## 2022-11-16 ENCOUNTER — NON-APPOINTMENT (OUTPATIENT)
Age: 52
End: 2022-11-16

## 2022-12-18 ENCOUNTER — OUTPATIENT (OUTPATIENT)
Dept: OUTPATIENT SERVICES | Facility: HOSPITAL | Age: 52
LOS: 1 days | Discharge: HOME | End: 2022-12-18

## 2022-12-18 DIAGNOSIS — Z90.49 ACQUIRED ABSENCE OF OTHER SPECIFIED PARTS OF DIGESTIVE TRACT: Chronic | ICD-10-CM

## 2022-12-18 DIAGNOSIS — Z90.89 ACQUIRED ABSENCE OF OTHER ORGANS: Chronic | ICD-10-CM

## 2022-12-18 DIAGNOSIS — Z52.4 KIDNEY DONOR: Chronic | ICD-10-CM

## 2022-12-18 DIAGNOSIS — R51.9 HEADACHE, UNSPECIFIED: ICD-10-CM

## 2022-12-18 DIAGNOSIS — Z98.890 OTHER SPECIFIED POSTPROCEDURAL STATES: Chronic | ICD-10-CM

## 2022-12-18 DIAGNOSIS — S36.00XA UNSPECIFIED INJURY OF SPLEEN, INITIAL ENCOUNTER: Chronic | ICD-10-CM

## 2022-12-18 DIAGNOSIS — Z98.891 HISTORY OF UTERINE SCAR FROM PREVIOUS SURGERY: Chronic | ICD-10-CM

## 2022-12-18 PROCEDURE — 70553 MRI BRAIN STEM W/O & W/DYE: CPT | Mod: 26

## 2023-01-19 NOTE — PRE-OP CHECKLIST - ADDITIONAL CONSENTS
BLOOD Complex Repair And Single Advancement Flap Text: The defect edges were debeveled with a #15 scalpel blade.  The primary defect was closed partially with a complex linear closure.  Given the location of the remaining defect, shape of the defect and the proximity to free margins a single advancement flap was deemed most appropriate for complete closure of the defect.  Using a sterile surgical marker, an appropriate advancement flap was drawn incorporating the defect and placing the expected incisions within the relaxed skin tension lines where possible.    The area thus outlined was incised deep to adipose tissue with a #15 scalpel blade.  The skin margins were undermined to an appropriate distance in all directions utilizing iris scissors.

## 2023-01-27 ENCOUNTER — APPOINTMENT (OUTPATIENT)
Dept: OBGYN | Facility: CLINIC | Age: 53
End: 2023-01-27
Payer: COMMERCIAL

## 2023-01-27 VITALS — DIASTOLIC BLOOD PRESSURE: 76 MMHG | HEIGHT: 62 IN | SYSTOLIC BLOOD PRESSURE: 120 MMHG

## 2023-01-27 DIAGNOSIS — B96.89 ACUTE VAGINITIS: ICD-10-CM

## 2023-01-27 DIAGNOSIS — N76.0 ACUTE VAGINITIS: ICD-10-CM

## 2023-01-27 DIAGNOSIS — Z87.42 PERSONAL HISTORY OF OTHER DISEASES OF THE FEMALE GENITAL TRACT: ICD-10-CM

## 2023-01-27 DIAGNOSIS — N89.8 OTHER SPECIFIED NONINFLAMMATORY DISORDERS OF VAGINA: ICD-10-CM

## 2023-01-27 PROCEDURE — 99213 OFFICE O/P EST LOW 20 MIN: CPT | Mod: 25

## 2023-01-27 PROCEDURE — 99396 PREV VISIT EST AGE 40-64: CPT

## 2023-01-27 NOTE — DISCUSSION/SUMMARY
[FreeTextEntry1] : Pap done, B VV test done\par Self breast exam stressed\par Prescribed bilateral screening mammogram\par Prescribed MetroGel vaginal as directed\par Advise consultation with urogynecologist\par Follow-up yearly or as needed\par

## 2023-01-27 NOTE — HISTORY OF PRESENT ILLNESS
[FreeTextEntry1] : Patient is 52 years old para 1-0-0-1 last menstrual period August 2017\par She denies postmenopausal bleeding.  Patient complains of intermittent urinary stress incontinence and states that she has to wear a pad.  She also notes recurrent vaginal discharge with odor.

## 2023-02-09 ENCOUNTER — OUTPATIENT (OUTPATIENT)
Dept: OUTPATIENT SERVICES | Facility: HOSPITAL | Age: 53
LOS: 1 days | End: 2023-02-09
Payer: COMMERCIAL

## 2023-02-09 ENCOUNTER — RESULT REVIEW (OUTPATIENT)
Age: 53
End: 2023-02-09

## 2023-02-09 DIAGNOSIS — Z90.89 ACQUIRED ABSENCE OF OTHER ORGANS: Chronic | ICD-10-CM

## 2023-02-09 DIAGNOSIS — Z98.891 HISTORY OF UTERINE SCAR FROM PREVIOUS SURGERY: Chronic | ICD-10-CM

## 2023-02-09 DIAGNOSIS — Z00.8 ENCOUNTER FOR OTHER GENERAL EXAMINATION: ICD-10-CM

## 2023-02-09 DIAGNOSIS — Z90.49 ACQUIRED ABSENCE OF OTHER SPECIFIED PARTS OF DIGESTIVE TRACT: Chronic | ICD-10-CM

## 2023-02-09 DIAGNOSIS — Z12.31 ENCOUNTER FOR SCREENING MAMMOGRAM FOR MALIGNANT NEOPLASM OF BREAST: ICD-10-CM

## 2023-02-09 DIAGNOSIS — Z98.890 OTHER SPECIFIED POSTPROCEDURAL STATES: Chronic | ICD-10-CM

## 2023-02-09 DIAGNOSIS — S36.00XA UNSPECIFIED INJURY OF SPLEEN, INITIAL ENCOUNTER: Chronic | ICD-10-CM

## 2023-02-09 DIAGNOSIS — Z52.4 KIDNEY DONOR: Chronic | ICD-10-CM

## 2023-02-09 PROCEDURE — 77067 SCR MAMMO BI INCL CAD: CPT

## 2023-02-09 PROCEDURE — 77063 BREAST TOMOSYNTHESIS BI: CPT | Mod: 26

## 2023-02-09 PROCEDURE — 77067 SCR MAMMO BI INCL CAD: CPT | Mod: 26

## 2023-02-09 PROCEDURE — 77063 BREAST TOMOSYNTHESIS BI: CPT

## 2023-04-25 ENCOUNTER — LABORATORY RESULT (OUTPATIENT)
Age: 53
End: 2023-04-25

## 2023-04-26 ENCOUNTER — APPOINTMENT (OUTPATIENT)
Dept: UROGYNECOLOGY | Facility: CLINIC | Age: 53
End: 2023-04-26
Payer: COMMERCIAL

## 2023-04-26 VITALS
HEART RATE: 69 BPM | WEIGHT: 174 LBS | DIASTOLIC BLOOD PRESSURE: 68 MMHG | SYSTOLIC BLOOD PRESSURE: 100 MMHG | BODY MASS INDEX: 40.27 KG/M2 | HEIGHT: 55 IN

## 2023-04-26 DIAGNOSIS — Z87.891 PERSONAL HISTORY OF NICOTINE DEPENDENCE: ICD-10-CM

## 2023-04-26 DIAGNOSIS — Z82.49 FAMILY HISTORY OF ISCHEMIC HEART DISEASE AND OTHER DISEASES OF THE CIRCULATORY SYSTEM: ICD-10-CM

## 2023-04-26 DIAGNOSIS — R35.0 FREQUENCY OF MICTURITION: ICD-10-CM

## 2023-04-26 DIAGNOSIS — Z01.419 ENCOUNTER FOR GYNECOLOGICAL EXAMINATION (GENERAL) (ROUTINE) W/OUT ABNORMAL FINDINGS: ICD-10-CM

## 2023-04-26 DIAGNOSIS — F17.200 NICOTINE DEPENDENCE, UNSPECIFIED, UNCOMPLICATED: ICD-10-CM

## 2023-04-26 DIAGNOSIS — Z01.411 ENCOUNTER FOR GYNECOLOGICAL EXAMINATION (GENERAL) (ROUTINE) WITH ABNORMAL FINDINGS: ICD-10-CM

## 2023-04-26 DIAGNOSIS — N76.0 ACUTE VAGINITIS: ICD-10-CM

## 2023-04-26 DIAGNOSIS — N39.3 STRESS INCONTINENCE (FEMALE) (MALE): ICD-10-CM

## 2023-04-26 DIAGNOSIS — N39.41 URGE INCONTINENCE: ICD-10-CM

## 2023-04-26 DIAGNOSIS — R39.15 URGENCY OF URINATION: ICD-10-CM

## 2023-04-26 DIAGNOSIS — Z83.3 FAMILY HISTORY OF DIABETES MELLITUS: ICD-10-CM

## 2023-04-26 DIAGNOSIS — R35.1 NOCTURIA: ICD-10-CM

## 2023-04-26 DIAGNOSIS — Z87.42 PERSONAL HISTORY OF OTHER DISEASES OF THE FEMALE GENITAL TRACT: ICD-10-CM

## 2023-04-26 DIAGNOSIS — N89.8 OTHER SPECIFIED NONINFLAMMATORY DISORDERS OF VAGINA: ICD-10-CM

## 2023-04-26 DIAGNOSIS — N95.2 POSTMENOPAUSAL ATROPHIC VAGINITIS: ICD-10-CM

## 2023-04-26 PROCEDURE — 99205 OFFICE O/P NEW HI 60 MIN: CPT | Mod: 25

## 2023-04-26 PROCEDURE — 99215 OFFICE O/P EST HI 40 MIN: CPT | Mod: 25

## 2023-04-26 PROCEDURE — 51701 INSERT BLADDER CATHETER: CPT

## 2023-04-26 RX ORDER — METRONIDAZOLE 500 MG/1
500 TABLET ORAL TWICE DAILY
Qty: 14 | Refills: 0 | Status: COMPLETED | COMMUNITY
Start: 2022-01-25 | End: 2023-04-26

## 2023-04-26 RX ORDER — GABAPENTIN 100 MG/1
100 CAPSULE ORAL
Qty: 63 | Refills: 0 | Status: COMPLETED | COMMUNITY
Start: 2020-06-16 | End: 2023-04-26

## 2023-04-26 RX ORDER — BUPROPION HYDROCHLORIDE 150 MG/1
150 TABLET, EXTENDED RELEASE ORAL
Qty: 60 | Refills: 0 | Status: COMPLETED | COMMUNITY
Start: 2020-04-15 | End: 2023-04-26

## 2023-04-26 RX ORDER — METOPROLOL TARTRATE 25 MG/1
25 TABLET, FILM COATED ORAL TWICE DAILY
Refills: 0 | Status: COMPLETED | COMMUNITY
End: 2023-04-26

## 2023-04-26 RX ORDER — METRONIDAZOLE 7.5 MG/G
0.75 GEL VAGINAL
Qty: 1 | Refills: 0 | Status: COMPLETED | COMMUNITY
Start: 2023-01-27 | End: 2023-04-26

## 2023-04-26 RX ORDER — FAMOTIDINE 20 MG/1
20 TABLET, FILM COATED ORAL
Qty: 60 | Refills: 0 | Status: COMPLETED | COMMUNITY
End: 2023-04-26

## 2023-04-26 RX ORDER — SENNOSIDES 8.6 MG TABLETS 8.6 MG/1
8.6 TABLET ORAL TWICE DAILY
Refills: 0 | Status: COMPLETED | COMMUNITY
End: 2023-04-26

## 2023-04-26 RX ORDER — BUDESONIDE AND FORMOTEROL FUMARATE DIHYDRATE 160; 4.5 UG/1; UG/1
160-4.5 AEROSOL RESPIRATORY (INHALATION)
Refills: 0 | Status: ACTIVE | COMMUNITY

## 2023-04-26 RX ORDER — DOCUSATE SODIUM 100 MG/1
100 CAPSULE ORAL 3 TIMES DAILY
Qty: 90 | Refills: 0 | Status: COMPLETED | COMMUNITY
End: 2023-04-26

## 2023-04-28 PROBLEM — N95.2 ATROPHIC VAGINITIS: Status: ACTIVE | Noted: 2022-01-24

## 2023-04-28 PROBLEM — R39.15 URINARY URGENCY: Status: ACTIVE | Noted: 2023-04-28

## 2023-04-28 LAB
APPEARANCE: CLEAR
BILIRUBIN URINE: NEGATIVE
BLOOD URINE: NEGATIVE
COLOR: YELLOW
GLUCOSE QUALITATIVE U: NEGATIVE
KETONES URINE: NEGATIVE
LEUKOCYTE ESTERASE URINE: NEGATIVE
NITRITE URINE: NEGATIVE
PH URINE: 6
PROTEIN URINE: ABNORMAL
SPECIFIC GRAVITY URINE: 1.02
UROBILINOGEN URINE: NORMAL

## 2023-04-28 NOTE — PHYSICAL EXAM
[Chaperone Present] : A chaperone was present in the examining room during all aspects of the physical examination [FreeTextEntry1] : Void: 100 cc\par \par PVR: 15 cc\par \par Urethra was prepped in sterile fashion and then a sterile 14F catheter was used by me to drain the bladder for her symptoms of urinary frequency. Patient tolerated the procedure well\par \par Well healed incision: Pfannenstiel\par \par neg empty cough stress test\par \par + severe atrophy\par \par no urethral caruncle\par \par no vestibular tenderness\par \par no prolapse\par \par + urethral hypermobility\par \par no pelvic floor dysfunction\par \par no urethral tenderness\par \par no bladder tenderness\par \par normal appearing cervix\par \par unable to palpate uterus due to patient habitus\par \par adnexa nonpalpable\par \par intact sacral nerves\par \par 1/5 Kegel\par

## 2023-04-28 NOTE — ASSESSMENT
[FreeTextEntry1] : OAB -\par Discussed etiology of the condition with the patient. Discussed management options, including first line management options consisting of diet and lifestyle modifications, second line options consisting of medications, and third line options. Reviewed PTNS, bladder botox, and Interstim. Discussed R/B/A of anticholinergics versus b-3 agonists. Patient will start with bladder diet and pelvic floor PT. \par \par Atrophic vaginitis -\par Discussed etiology and treatment options with patient. Discussed R/B/A of estrogen vaginal cream. Patient will start using as follows:\par Place a pea size (0.5 grams or less) dab of Estrogen vaginal cream using finger (NOT the applicator) into the vagina 3 times a week ( Monday, Wednesday, Friday)\par \par GISELE -\par Not very bothersome to the patient. Will proceed with observation at this time.

## 2023-04-28 NOTE — HISTORY OF PRESENT ILLNESS
[FreeTextEntry1] : 52 year para 1 (c/s x1) presents with complaints of leakage of urine and urinary frequency for the past year and it has gotten worse.\par Currently being worked up for a possible autoimmune disorder. \par \par Pelvic organ prolapse: no bulge, no pressure/heaviness\par \par Stress urinary incontinence: <1 x/week no prior incontinence procedures\par \par Overactive bladder syndrome: daily frequency 10 x/day, 0-3 x/night,  + urgency,  <1 x/week UUI episodes,    1 pads/day     Bladder irritants include coffee (3 cups),    Prior OAB meds no\par \par Voiding dysfunction: + Incomplete bladder emptying, no hesitancy\par \par Lower urinary tract/vaginal symptoms: no UTIs per year, no hematuria, no dysuria, no bladder pain\par \par 4 BM/week   no constipation   Fecal incontinence no\par \par Sexually active no    Pelvic pain no   Vaginal dryness +   LMP age 46  PMB no\par

## 2023-04-28 NOTE — COUNSELING
[FreeTextEntry1] : Please follow up with the physician assistant in about 6 weeks.\par \par Place a pea size (0.5 grams or less) dab of Estrogen vaginal cream using finger (NOT the applicator) into the vagina 3 times a week ( Monday, Wednesday, Friday)\par \par For Urgency, Frequency and Urge related incontinence.\par \par Please decrease or stop the use of the following:\par \par 1. Coffee (Caffeinated and decaffeinated)\par \par 2. Teas (Caffeinated, decaffeinated, Ice tea, and green teas\par \par 3. All sodas (Caffeinated, decaffeinated, energy drinks)\par \par 4. All carbonated drinks including seltzer water\par \par 5. All citric fruit juices\par \par 6. Water with lemon or lime\par \par 7. Spicy foods\par \par 8. Tomato Sauce based foods\par \par 9. Chocolate and chocolate containing products\par \par 10. All alcohol\par \par [facility]\par [address] [place] [address]\par Phone: [phone]\par

## 2023-05-01 LAB — URINE CULTURE <10: NORMAL

## 2023-05-03 ENCOUNTER — OUTPATIENT (OUTPATIENT)
Dept: OUTPATIENT SERVICES | Facility: HOSPITAL | Age: 53
LOS: 1 days | End: 2023-05-03
Payer: COMMERCIAL

## 2023-05-03 DIAGNOSIS — Z52.4 KIDNEY DONOR: Chronic | ICD-10-CM

## 2023-05-03 DIAGNOSIS — Z98.890 OTHER SPECIFIED POSTPROCEDURAL STATES: Chronic | ICD-10-CM

## 2023-05-03 DIAGNOSIS — Z00.8 ENCOUNTER FOR OTHER GENERAL EXAMINATION: ICD-10-CM

## 2023-05-03 DIAGNOSIS — Z98.891 HISTORY OF UTERINE SCAR FROM PREVIOUS SURGERY: Chronic | ICD-10-CM

## 2023-05-03 DIAGNOSIS — Z90.89 ACQUIRED ABSENCE OF OTHER ORGANS: Chronic | ICD-10-CM

## 2023-05-03 DIAGNOSIS — S36.00XA UNSPECIFIED INJURY OF SPLEEN, INITIAL ENCOUNTER: Chronic | ICD-10-CM

## 2023-05-03 DIAGNOSIS — Z90.49 ACQUIRED ABSENCE OF OTHER SPECIFIED PARTS OF DIGESTIVE TRACT: Chronic | ICD-10-CM

## 2023-05-03 DIAGNOSIS — R10.9 UNSPECIFIED ABDOMINAL PAIN: ICD-10-CM

## 2023-05-03 PROCEDURE — 76700 US EXAM ABDOM COMPLETE: CPT

## 2023-05-03 PROCEDURE — 76700 US EXAM ABDOM COMPLETE: CPT | Mod: 26

## 2023-05-04 DIAGNOSIS — R10.9 UNSPECIFIED ABDOMINAL PAIN: ICD-10-CM

## 2023-05-17 ENCOUNTER — OUTPATIENT (OUTPATIENT)
Dept: OUTPATIENT SERVICES | Facility: HOSPITAL | Age: 53
LOS: 1 days | End: 2023-05-17
Payer: COMMERCIAL

## 2023-05-17 DIAGNOSIS — Z98.891 HISTORY OF UTERINE SCAR FROM PREVIOUS SURGERY: Chronic | ICD-10-CM

## 2023-05-17 DIAGNOSIS — R10.9 UNSPECIFIED ABDOMINAL PAIN: ICD-10-CM

## 2023-05-17 DIAGNOSIS — Z98.890 OTHER SPECIFIED POSTPROCEDURAL STATES: Chronic | ICD-10-CM

## 2023-05-17 DIAGNOSIS — Z90.89 ACQUIRED ABSENCE OF OTHER ORGANS: Chronic | ICD-10-CM

## 2023-05-17 DIAGNOSIS — Z00.8 ENCOUNTER FOR OTHER GENERAL EXAMINATION: ICD-10-CM

## 2023-05-17 DIAGNOSIS — S36.00XA UNSPECIFIED INJURY OF SPLEEN, INITIAL ENCOUNTER: Chronic | ICD-10-CM

## 2023-05-17 DIAGNOSIS — Z52.4 KIDNEY DONOR: Chronic | ICD-10-CM

## 2023-05-17 DIAGNOSIS — Z90.49 ACQUIRED ABSENCE OF OTHER SPECIFIED PARTS OF DIGESTIVE TRACT: Chronic | ICD-10-CM

## 2023-05-17 PROCEDURE — 74183 MRI ABD W/O CNTR FLWD CNTR: CPT | Mod: 26

## 2023-05-17 PROCEDURE — A9579: CPT

## 2023-05-17 PROCEDURE — 74183 MRI ABD W/O CNTR FLWD CNTR: CPT

## 2023-05-18 DIAGNOSIS — R10.9 UNSPECIFIED ABDOMINAL PAIN: ICD-10-CM

## 2023-05-22 ENCOUNTER — OUTPATIENT (OUTPATIENT)
Dept: OUTPATIENT SERVICES | Facility: HOSPITAL | Age: 53
LOS: 1 days | End: 2023-05-22
Payer: COMMERCIAL

## 2023-05-22 DIAGNOSIS — R91.8 OTHER NONSPECIFIC ABNORMAL FINDING OF LUNG FIELD: ICD-10-CM

## 2023-05-22 DIAGNOSIS — Z90.49 ACQUIRED ABSENCE OF OTHER SPECIFIED PARTS OF DIGESTIVE TRACT: Chronic | ICD-10-CM

## 2023-05-22 DIAGNOSIS — Z90.89 ACQUIRED ABSENCE OF OTHER ORGANS: Chronic | ICD-10-CM

## 2023-05-22 DIAGNOSIS — Z98.891 HISTORY OF UTERINE SCAR FROM PREVIOUS SURGERY: Chronic | ICD-10-CM

## 2023-05-22 DIAGNOSIS — Z52.4 KIDNEY DONOR: Chronic | ICD-10-CM

## 2023-05-22 DIAGNOSIS — Z98.890 OTHER SPECIFIED POSTPROCEDURAL STATES: Chronic | ICD-10-CM

## 2023-05-22 DIAGNOSIS — Z00.8 ENCOUNTER FOR OTHER GENERAL EXAMINATION: ICD-10-CM

## 2023-05-22 DIAGNOSIS — R91.1 SOLITARY PULMONARY NODULE: ICD-10-CM

## 2023-05-22 DIAGNOSIS — S36.00XA UNSPECIFIED INJURY OF SPLEEN, INITIAL ENCOUNTER: Chronic | ICD-10-CM

## 2023-05-22 PROCEDURE — 71250 CT THORAX DX C-: CPT

## 2023-05-22 PROCEDURE — 71250 CT THORAX DX C-: CPT | Mod: 26

## 2023-05-23 DIAGNOSIS — R91.8 OTHER NONSPECIFIC ABNORMAL FINDING OF LUNG FIELD: ICD-10-CM

## 2023-05-23 DIAGNOSIS — R91.1 SOLITARY PULMONARY NODULE: ICD-10-CM

## 2023-06-08 ENCOUNTER — APPOINTMENT (OUTPATIENT)
Dept: UROGYNECOLOGY | Facility: CLINIC | Age: 53
End: 2023-06-08

## 2023-08-14 ENCOUNTER — OUTPATIENT (OUTPATIENT)
Dept: OUTPATIENT SERVICES | Facility: HOSPITAL | Age: 53
LOS: 1 days | End: 2023-08-14
Payer: COMMERCIAL

## 2023-08-14 VITALS
OXYGEN SATURATION: 98 % | SYSTOLIC BLOOD PRESSURE: 140 MMHG | RESPIRATION RATE: 16 BRPM | TEMPERATURE: 98 F | WEIGHT: 189.6 LBS | HEART RATE: 80 BPM | HEIGHT: 62 IN | DIASTOLIC BLOOD PRESSURE: 78 MMHG

## 2023-08-14 DIAGNOSIS — Z98.890 OTHER SPECIFIED POSTPROCEDURAL STATES: Chronic | ICD-10-CM

## 2023-08-14 DIAGNOSIS — Z01.818 ENCOUNTER FOR OTHER PREPROCEDURAL EXAMINATION: ICD-10-CM

## 2023-08-14 DIAGNOSIS — Z52.4 KIDNEY DONOR: Chronic | ICD-10-CM

## 2023-08-14 DIAGNOSIS — Z90.89 ACQUIRED ABSENCE OF OTHER ORGANS: Chronic | ICD-10-CM

## 2023-08-14 DIAGNOSIS — S36.00XA UNSPECIFIED INJURY OF SPLEEN, INITIAL ENCOUNTER: Chronic | ICD-10-CM

## 2023-08-14 DIAGNOSIS — K75.4 AUTOIMMUNE HEPATITIS: ICD-10-CM

## 2023-08-14 DIAGNOSIS — Z90.49 ACQUIRED ABSENCE OF OTHER SPECIFIED PARTS OF DIGESTIVE TRACT: Chronic | ICD-10-CM

## 2023-08-14 DIAGNOSIS — Z98.891 HISTORY OF UTERINE SCAR FROM PREVIOUS SURGERY: Chronic | ICD-10-CM

## 2023-08-14 PROCEDURE — 99214 OFFICE O/P EST MOD 30 MIN: CPT | Mod: 25

## 2023-08-14 PROCEDURE — 93010 ELECTROCARDIOGRAM REPORT: CPT

## 2023-08-14 PROCEDURE — 93005 ELECTROCARDIOGRAM TRACING: CPT
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
## 2023-08-14 NOTE — H&P PST ADULT - HISTORY OF PRESENT ILLNESS
PT STATES  'I'M HAVING A LIVE BIOPSY-MY LIVER ENZYMES ARE ELEVATED"  DENIES PAIN     Revised Cardiac Risk Index for Pre-Operative Risk from [facility]  on 8/14/2023  ** All calculations should be rechecked by clinician prior to use **    RESULT SUMMARY:  0 points  Class I Risk    3.9 %  30-day risk of death, MI, or cardiac arrest    From Clive 2017, based on pooled data from 5 high quality external validations (4 prospective). These numbers are higher than those often quoted from the now-outdated original study (Jayson 1999). See Evidence for details.      INPUTS:  Elevated-risk surgery —> 0 = No  History of ischemic heart disease —> 0 = No  History of congestive heart failure —> 0 = No  History of cerebrovascular disease —> 0 = No  Pre-operative treatment with insulin —> 0 = No  Pre-operative creatinine >2 mg/dL / 176.8 µmol/L —> 0 = No        Duke Activity Status Index (DASI) from [facility]  on 8/14/2023  ** All calculations should be rechecked by clinician prior to use **    RESULT SUMMARY:  50.7 points  The higher the score (maximum 58.2), the higher the functional status.    8.97 METs        INPUTS:  Take care of self —> 2.75 = Yes  Walk indoors —> 1.75 = Yes  Walk 1&ndash;2 blocks on level ground —> 2.75 = Yes  Climb a flight of stairs or walk up a hill —> 5.5 = Yes  Run a short distance —> 8 = Yes  Do light work around the house —> 2.7 = Yes  Do moderate work around the house —> 3.5 = Yes  Do heavy work around the house —> 8 = Yes  Do yardwork —> 4.5 = Yes  Have sexual relations —> 5.25 = Yes  Participate in moderate recreational activities —> 6 = Yes  Participate in strenuous sports —> 0 = No  PATIENT CURRENTLY DENIES CHEST PAIN    PALPITATIONS,  DYSURIA, OR URI WITHIN THE IN PAST 2 WEEKS    -Travel to [place] in the past 30 days  -Patient denies any signs or symptoms of COVID 19 and denies contact with known positive individuals.    -Patient was instructed to quarantine pre-procedure, practice exposure control measures, continue to self-monitor and report any concerns to their proceduralist.  -Pt advised self quarantine till day of procedure    ANESTHESIA ALERT  NO--Difficult Airway  NO--History of neck surgery or radiation  NO--Limited ROM of neck  NO--History of Malignant hyperthermia  NO--No personal or family history of Pseudocholinesterase deficiency.  NO--Prior Anesthesia Complication  NO--Latex Allergy  NO--Loose teeth  NO--History of Rheumatoid Arthritis/Elevated MO  NO--Bleeding risk/Abnormal LFT's  NO--KATHIE  NO--Implants  NO--Other_____    -PT DENIES ANY RASHES, ABRASION, OR OPEN WOUNDS     -Pt denies any suicidal ideation or thoughts.  Pt states not a threat to self or others.  DENIES DOMESTIC VIOLENCE      AS PER THE PT, THIS IS HIS/HER COMPLETE MEDICAL AND SURGICAL HX, INCLUDING MEDICATIONS PRESCRIBED AND OVER THE COUNTER    Patient verbalized understanding of instructions and was given the opportunity to ask questions and have them answered.

## 2023-08-14 NOTE — H&P PST ADULT - REASON FOR ADMISSION
Suite: Interventional RadiologyProceduralist: Héctor Nielson  Confirmed Surgery DateTime: 08- - 0:00PAST DateTime: 08- - 0:00Procedure: Liver Core Bx  ERP?: UnavailableLaterality: N/ALength of Procedure: 60 Minutes  Anesthesia Type: Local Standby
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
## 2023-08-15 DIAGNOSIS — Z01.818 ENCOUNTER FOR OTHER PREPROCEDURAL EXAMINATION: ICD-10-CM

## 2023-08-15 DIAGNOSIS — K75.4 AUTOIMMUNE HEPATITIS: ICD-10-CM

## 2023-08-18 ENCOUNTER — TRANSCRIPTION ENCOUNTER (OUTPATIENT)
Age: 53
End: 2023-08-18

## 2023-08-18 ENCOUNTER — OUTPATIENT (OUTPATIENT)
Dept: OUTPATIENT SERVICES | Facility: HOSPITAL | Age: 53
LOS: 1 days | Discharge: HOME | End: 2023-08-18
Payer: COMMERCIAL

## 2023-08-18 ENCOUNTER — RESULT REVIEW (OUTPATIENT)
Age: 53
End: 2023-08-18

## 2023-08-18 VITALS
WEIGHT: 192.02 LBS | HEIGHT: 62 IN | DIASTOLIC BLOOD PRESSURE: 76 MMHG | OXYGEN SATURATION: 98 % | SYSTOLIC BLOOD PRESSURE: 129 MMHG | TEMPERATURE: 98 F

## 2023-08-18 VITALS
SYSTOLIC BLOOD PRESSURE: 133 MMHG | TEMPERATURE: 99 F | HEART RATE: 76 BPM | DIASTOLIC BLOOD PRESSURE: 69 MMHG | OXYGEN SATURATION: 100 % | RESPIRATION RATE: 20 BRPM

## 2023-08-18 DIAGNOSIS — Z98.890 OTHER SPECIFIED POSTPROCEDURAL STATES: Chronic | ICD-10-CM

## 2023-08-18 DIAGNOSIS — Z98.891 HISTORY OF UTERINE SCAR FROM PREVIOUS SURGERY: Chronic | ICD-10-CM

## 2023-08-18 DIAGNOSIS — K75.4 AUTOIMMUNE HEPATITIS: ICD-10-CM

## 2023-08-18 DIAGNOSIS — S36.00XA UNSPECIFIED INJURY OF SPLEEN, INITIAL ENCOUNTER: Chronic | ICD-10-CM

## 2023-08-18 DIAGNOSIS — Z90.49 ACQUIRED ABSENCE OF OTHER SPECIFIED PARTS OF DIGESTIVE TRACT: Chronic | ICD-10-CM

## 2023-08-18 DIAGNOSIS — Z52.4 KIDNEY DONOR: Chronic | ICD-10-CM

## 2023-08-18 DIAGNOSIS — Z90.89 ACQUIRED ABSENCE OF OTHER ORGANS: Chronic | ICD-10-CM

## 2023-08-18 PROCEDURE — 88312 SPECIAL STAINS GROUP 1: CPT | Mod: 26

## 2023-08-18 PROCEDURE — 47000 NEEDLE BIOPSY OF LIVER PERQ: CPT

## 2023-08-18 PROCEDURE — 88313 SPECIAL STAINS GROUP 2: CPT

## 2023-08-18 PROCEDURE — 88312 SPECIAL STAINS GROUP 1: CPT

## 2023-08-18 PROCEDURE — 99153 MOD SED SAME PHYS/QHP EA: CPT

## 2023-08-18 PROCEDURE — 76942 ECHO GUIDE FOR BIOPSY: CPT

## 2023-08-18 PROCEDURE — 99152 MOD SED SAME PHYS/QHP 5/>YRS: CPT

## 2023-08-18 PROCEDURE — C1889: CPT

## 2023-08-18 PROCEDURE — 88307 TISSUE EXAM BY PATHOLOGIST: CPT

## 2023-08-18 PROCEDURE — 88307 TISSUE EXAM BY PATHOLOGIST: CPT | Mod: 26

## 2023-08-18 PROCEDURE — 76942 ECHO GUIDE FOR BIOPSY: CPT | Mod: 26

## 2023-08-18 PROCEDURE — 88313 SPECIAL STAINS GROUP 2: CPT | Mod: 26

## 2023-08-18 RX ORDER — ALBUTEROL 90 UG/1
2 AEROSOL, METERED ORAL
Refills: 0 | DISCHARGE

## 2023-08-18 RX ORDER — BUDESONIDE AND FORMOTEROL FUMARATE DIHYDRATE 160; 4.5 UG/1; UG/1
2 AEROSOL RESPIRATORY (INHALATION)
Refills: 0 | DISCHARGE

## 2023-08-18 NOTE — PROGRESS NOTE ADULT - SUBJECTIVE AND OBJECTIVE BOX
INTERVENTIONAL RADIOLOGY BRIEF-OPERATIVE NOTE    Procedure: non-target liver biopsy    Pre-Op Diagnosis: liver dysfunction    Post-Op Diagnosis: same as pre    Attending: Ally  Resident:     Anesthesia (type):  [ ] General Anesthesia  [ ] Deep Sedation - provided by anesthesiologist  [x ] Conscious Sedation -  Versed and Fentanyl   [x ] Local/Regional    Contrast: 0    Estimated Blood Loss: <10cc    Condition:   [ ] Critical  [ ] Serious  [ ] Fair   [x ] Good    Findings/Follow up Plan of Care: 18G core biopsy of right hepatic lobe.     Specimens Removed: see above    Implants: none    Complications: no acute    Disposition: recovery x 2 hours then home.       Please call Interventional Radiology [phone] with any questions, concerns, or issues.

## 2023-08-18 NOTE — PROGRESS NOTE ADULT - SUBJECTIVE AND OBJECTIVE BOX
PREOPERATIVE DAY OF PROCEDURE EVALUATION:     I have personally seen and examined this patient. I agree with the history and physical which I have reviewed and noted any changes below:     Plan is for non target liver biopsy with conscious sedation today 8/18  H+P from PST on 8/14    Procedure/ risks/ benefits/ goals/ alternatives were explained. All questions answered. Informed content obtained from patient. Consent placed in chart.

## 2023-08-22 ENCOUNTER — OUTPATIENT (OUTPATIENT)
Dept: OUTPATIENT SERVICES | Facility: HOSPITAL | Age: 53
LOS: 1 days | End: 2023-08-22
Payer: COMMERCIAL

## 2023-08-22 DIAGNOSIS — Z98.891 HISTORY OF UTERINE SCAR FROM PREVIOUS SURGERY: Chronic | ICD-10-CM

## 2023-08-22 DIAGNOSIS — R06.02 SHORTNESS OF BREATH: ICD-10-CM

## 2023-08-22 DIAGNOSIS — Z98.890 OTHER SPECIFIED POSTPROCEDURAL STATES: Chronic | ICD-10-CM

## 2023-08-22 DIAGNOSIS — Z90.49 ACQUIRED ABSENCE OF OTHER SPECIFIED PARTS OF DIGESTIVE TRACT: Chronic | ICD-10-CM

## 2023-08-22 DIAGNOSIS — S36.00XA UNSPECIFIED INJURY OF SPLEEN, INITIAL ENCOUNTER: Chronic | ICD-10-CM

## 2023-08-22 DIAGNOSIS — Z90.89 ACQUIRED ABSENCE OF OTHER ORGANS: Chronic | ICD-10-CM

## 2023-08-22 DIAGNOSIS — Z52.4 KIDNEY DONOR: Chronic | ICD-10-CM

## 2023-08-22 PROBLEM — C34.90 MALIGNANT NEOPLASM OF UNSPECIFIED PART OF UNSPECIFIED BRONCHUS OR LUNG: Chronic | Status: ACTIVE | Noted: 2023-08-14
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
## 2023-08-22 LAB — SURGICAL PATHOLOGY STUDY: SIGNIFICANT CHANGE UP

## 2023-08-22 PROCEDURE — 71046 X-RAY EXAM CHEST 2 VIEWS: CPT

## 2023-08-22 PROCEDURE — 71046 X-RAY EXAM CHEST 2 VIEWS: CPT | Mod: 26

## 2023-08-23 DIAGNOSIS — R06.02 SHORTNESS OF BREATH: ICD-10-CM

## 2023-08-23 DIAGNOSIS — R79.89 OTHER SPECIFIED ABNORMAL FINDINGS OF BLOOD CHEMISTRY: ICD-10-CM

## 2023-10-11 NOTE — MONOCLONAL ANTIBODY INFUSION - ASSESSMENT AND PLAN
CC: Monoclonal Antibody Infusion/COVID 19 Positive    exam/findings:  T(C): 36.8 (08-09-22 @ 08:25), Max: 36.8 (08-09-22 @ 08:25)  HR: 63 (08-09-22 @ 08:25) (63 - 63)  BP: 131/89 (08-09-22 @ 08:25) (131/89 - 131/89)  RR: 16 (08-09-22 @ 08:25) (16 - 16)  SpO2: 99% (08-09-22 @ 08:25) (99% - 99%)      PE:   Appearance: NAD	  HEENT:   Normal oral mucosa,   Lymphatic: No lymphadenopathy  Cardiovascular: Normal S1 S2, No JVD, No murmurs, No edema  Respiratory: Lungs clear to auscultation	  Gastrointestinal:  Soft, Non-tender, + BS	  Skin: warm and dry  Neurologic: Non-focal  Extremities: Normal range of motion, no calf tenderness or edema    ASSESSMENT:  Pt is a 52y Female with PMHx lung CA, COPD, hiatal hernia, splenectomy and symptoms of laryngitis, HA, post nasal drip, neck pain who is Covid 19 Positive on 8/8/2022, who presents to infusion center for Monoclonal antibody infusion (Bebtelovimab)    PLAN:  - infusion procedure explained to patient   - Consent for monoclonal antibody infusion obtained   - Risk & benefits discussed/all questions answered  - infuse Bebtelovimab as per protocol  - observe patient for one hour post infusion        I have reviewed the Bebtelovimab Emergency Use Authorization (EAU) and I have provided the patient or patient's caregiver with the following information:  1. FDA has authorized emergency use of Bebtelovimab, which is not FDA-approved biologic product.  2. The patient or patient's caregiver has the option to accept or refuse administration of Bebtelovimab   3. The significant known and benefits are unknown.  4. Information on available alternative treatments and risks and benefits of those alternatives.    Discharge:  Patient tolerated infusion well denies complaints of chest pain/SOB/dizziness/ palps  Vital signs stable for discharge home ---  D/C instructions given/ fact sheet included.  Patient to follow-up with PCP as needed.       Aklief Pregnancy And Lactation Text: It is unknown if this medication is safe to use during pregnancy.  It is unknown if this medication is excreted in breast milk.  Breastfeeding women should use the topical cream on the smallest area of the skin for the shortest time needed while breastfeeding.  Do not apply to nipple and areola.

## 2023-11-16 ENCOUNTER — OUTPATIENT (OUTPATIENT)
Dept: OUTPATIENT SERVICES | Facility: HOSPITAL | Age: 53
LOS: 1 days | End: 2023-11-16
Payer: COMMERCIAL

## 2023-11-16 DIAGNOSIS — Z00.8 ENCOUNTER FOR OTHER GENERAL EXAMINATION: ICD-10-CM

## 2023-11-16 DIAGNOSIS — Z90.89 ACQUIRED ABSENCE OF OTHER ORGANS: Chronic | ICD-10-CM

## 2023-11-16 DIAGNOSIS — Z98.891 HISTORY OF UTERINE SCAR FROM PREVIOUS SURGERY: Chronic | ICD-10-CM

## 2023-11-16 DIAGNOSIS — R91.8 OTHER NONSPECIFIC ABNORMAL FINDING OF LUNG FIELD: ICD-10-CM

## 2023-11-16 DIAGNOSIS — Z90.49 ACQUIRED ABSENCE OF OTHER SPECIFIED PARTS OF DIGESTIVE TRACT: Chronic | ICD-10-CM

## 2023-11-16 DIAGNOSIS — Z52.4 KIDNEY DONOR: Chronic | ICD-10-CM

## 2023-11-16 DIAGNOSIS — Z98.890 OTHER SPECIFIED POSTPROCEDURAL STATES: Chronic | ICD-10-CM

## 2023-11-16 DIAGNOSIS — S36.00XA UNSPECIFIED INJURY OF SPLEEN, INITIAL ENCOUNTER: Chronic | ICD-10-CM

## 2023-11-16 PROCEDURE — 71250 CT THORAX DX C-: CPT

## 2023-11-16 PROCEDURE — 71250 CT THORAX DX C-: CPT | Mod: 26

## 2023-11-17 DIAGNOSIS — R91.8 OTHER NONSPECIFIC ABNORMAL FINDING OF LUNG FIELD: ICD-10-CM

## 2024-02-12 ENCOUNTER — APPOINTMENT (OUTPATIENT)
Dept: OBGYN | Facility: CLINIC | Age: 54
End: 2024-02-12
Payer: COMMERCIAL

## 2024-02-12 VITALS — HEIGHT: 62 IN

## 2024-02-12 DIAGNOSIS — Z01.419 ENCOUNTER FOR GYNECOLOGICAL EXAMINATION (GENERAL) (ROUTINE) W/OUT ABNORMAL FINDINGS: ICD-10-CM

## 2024-02-12 PROCEDURE — 99396 PREV VISIT EST AGE 40-64: CPT

## 2024-02-12 RX ORDER — PREDNISONE 2.5 MG/1
2.5 TABLET ORAL
Refills: 0 | Status: ACTIVE | COMMUNITY

## 2024-02-12 RX ORDER — ESTRADIOL 0.1 MG/G
0.1 CREAM VAGINAL
Qty: 1 | Refills: 0 | Status: COMPLETED | COMMUNITY
Start: 2023-05-01 | End: 2024-02-12

## 2024-02-12 RX ORDER — AZATHIOPRINE 100 MG/1
100 TABLET ORAL
Refills: 0 | Status: ACTIVE | COMMUNITY

## 2024-02-12 NOTE — HISTORY OF PRESENT ILLNESS
[FreeTextEntry1] : Patient is 53 years old para 1-0-0-1 last menstrual period August 2017. She denies postmenopausal bleeding. She has a history of urinary stress incontinence and is status post evaluation with urogynecologist Patient states that she was diagnosed with autoimmune hepatitis, is presently on prednisone and is followed by hepatologist at [facility]

## 2024-02-12 NOTE — PHYSICAL EXAM
[Chaperone Present] : A chaperone was present in the examining room during all aspects of the physical examination [FreeTextEntry1] : Dorcas [Appropriately responsive] : appropriately responsive [Alert] : alert [No Acute Distress] : no acute distress [No Lymphadenopathy] : no lymphadenopathy [Regular Rate Rhythm] : regular rate rhythm [No Murmurs] : no murmurs [Clear to Auscultation B/L] : clear to auscultation bilaterally [Soft] : soft [Non-tender] : non-tender [Non-distended] : non-distended [No HSM] : No HSM [No Lesions] : no lesions [No Mass] : no mass [Oriented x3] : oriented x3 [Examination Of The Breasts] : a normal appearance [No Masses] : no breast masses were palpable [Labia Majora] : normal [Labia Minora] : normal [Normal] : normal [Uterine Adnexae] : normal

## 2024-02-12 NOTE — DISCUSSION/SUMMARY
[FreeTextEntry1] : Pap done Self breast exam stressed Prescribed bilateral screening mammogram Prescribed pelvic ultrasound Prescribed DEXA scan Follow-up yearly or as needed

## 2024-02-17 ENCOUNTER — OUTPATIENT (OUTPATIENT)
Dept: OUTPATIENT SERVICES | Facility: HOSPITAL | Age: 54
LOS: 1 days | End: 2024-02-17
Payer: COMMERCIAL

## 2024-02-17 ENCOUNTER — RESULT REVIEW (OUTPATIENT)
Age: 54
End: 2024-02-17

## 2024-02-17 DIAGNOSIS — Z98.890 OTHER SPECIFIED POSTPROCEDURAL STATES: Chronic | ICD-10-CM

## 2024-02-17 DIAGNOSIS — Z12.31 ENCOUNTER FOR SCREENING MAMMOGRAM FOR MALIGNANT NEOPLASM OF BREAST: ICD-10-CM

## 2024-02-17 DIAGNOSIS — S36.00XA UNSPECIFIED INJURY OF SPLEEN, INITIAL ENCOUNTER: Chronic | ICD-10-CM

## 2024-02-17 DIAGNOSIS — Z52.4 KIDNEY DONOR: Chronic | ICD-10-CM

## 2024-02-17 DIAGNOSIS — Z90.49 ACQUIRED ABSENCE OF OTHER SPECIFIED PARTS OF DIGESTIVE TRACT: Chronic | ICD-10-CM

## 2024-02-17 DIAGNOSIS — Z98.891 HISTORY OF UTERINE SCAR FROM PREVIOUS SURGERY: Chronic | ICD-10-CM

## 2024-02-17 DIAGNOSIS — Z90.89 ACQUIRED ABSENCE OF OTHER ORGANS: Chronic | ICD-10-CM

## 2024-02-17 PROCEDURE — 77063 BREAST TOMOSYNTHESIS BI: CPT | Mod: 26

## 2024-02-17 PROCEDURE — 77067 SCR MAMMO BI INCL CAD: CPT | Mod: 26

## 2024-02-17 PROCEDURE — 77067 SCR MAMMO BI INCL CAD: CPT

## 2024-02-17 PROCEDURE — 77063 BREAST TOMOSYNTHESIS BI: CPT

## 2024-02-18 DIAGNOSIS — Z12.31 ENCOUNTER FOR SCREENING MAMMOGRAM FOR MALIGNANT NEOPLASM OF BREAST: ICD-10-CM

## 2024-05-11 ENCOUNTER — OUTPATIENT (OUTPATIENT)
Dept: OUTPATIENT SERVICES | Facility: HOSPITAL | Age: 54
LOS: 1 days | End: 2024-05-11
Payer: COMMERCIAL

## 2024-05-11 DIAGNOSIS — Z52.4 KIDNEY DONOR: Chronic | ICD-10-CM

## 2024-05-11 DIAGNOSIS — Z98.890 OTHER SPECIFIED POSTPROCEDURAL STATES: Chronic | ICD-10-CM

## 2024-05-11 DIAGNOSIS — Z00.8 ENCOUNTER FOR OTHER GENERAL EXAMINATION: ICD-10-CM

## 2024-05-11 DIAGNOSIS — Z90.89 ACQUIRED ABSENCE OF OTHER ORGANS: Chronic | ICD-10-CM

## 2024-05-11 DIAGNOSIS — R91.1 SOLITARY PULMONARY NODULE: ICD-10-CM

## 2024-05-11 DIAGNOSIS — S36.00XA UNSPECIFIED INJURY OF SPLEEN, INITIAL ENCOUNTER: Chronic | ICD-10-CM

## 2024-05-11 DIAGNOSIS — Z90.49 ACQUIRED ABSENCE OF OTHER SPECIFIED PARTS OF DIGESTIVE TRACT: Chronic | ICD-10-CM

## 2024-05-11 DIAGNOSIS — Z98.891 HISTORY OF UTERINE SCAR FROM PREVIOUS SURGERY: Chronic | ICD-10-CM

## 2024-05-11 PROCEDURE — 71250 CT THORAX DX C-: CPT | Mod: 26

## 2024-05-11 PROCEDURE — 71250 CT THORAX DX C-: CPT

## 2024-05-12 DIAGNOSIS — R91.1 SOLITARY PULMONARY NODULE: ICD-10-CM

## 2024-11-10 ENCOUNTER — OUTPATIENT (OUTPATIENT)
Dept: OUTPATIENT SERVICES | Facility: HOSPITAL | Age: 54
LOS: 1 days | End: 2024-11-10
Payer: COMMERCIAL

## 2024-11-10 ENCOUNTER — NON-APPOINTMENT (OUTPATIENT)
Age: 54
End: 2024-11-10

## 2024-11-10 DIAGNOSIS — S36.00XA UNSPECIFIED INJURY OF SPLEEN, INITIAL ENCOUNTER: Chronic | ICD-10-CM

## 2024-11-10 DIAGNOSIS — Z98.890 OTHER SPECIFIED POSTPROCEDURAL STATES: Chronic | ICD-10-CM

## 2024-11-10 DIAGNOSIS — Z52.4 KIDNEY DONOR: Chronic | ICD-10-CM

## 2024-11-10 DIAGNOSIS — Z90.49 ACQUIRED ABSENCE OF OTHER SPECIFIED PARTS OF DIGESTIVE TRACT: Chronic | ICD-10-CM

## 2024-11-10 DIAGNOSIS — Z90.89 ACQUIRED ABSENCE OF OTHER ORGANS: Chronic | ICD-10-CM

## 2024-11-10 DIAGNOSIS — Z00.8 ENCOUNTER FOR OTHER GENERAL EXAMINATION: ICD-10-CM

## 2024-11-10 DIAGNOSIS — R91.8 OTHER NONSPECIFIC ABNORMAL FINDING OF LUNG FIELD: ICD-10-CM

## 2024-11-10 DIAGNOSIS — Z98.891 HISTORY OF UTERINE SCAR FROM PREVIOUS SURGERY: Chronic | ICD-10-CM

## 2024-11-10 PROCEDURE — 71250 CT THORAX DX C-: CPT | Mod: 26

## 2024-11-10 PROCEDURE — 71250 CT THORAX DX C-: CPT

## 2024-11-11 DIAGNOSIS — R91.8 OTHER NONSPECIFIC ABNORMAL FINDING OF LUNG FIELD: ICD-10-CM

## 2025-01-14 ENCOUNTER — NON-APPOINTMENT (OUTPATIENT)
Age: 55
End: 2025-01-14

## 2025-02-20 ENCOUNTER — APPOINTMENT (OUTPATIENT)
Dept: OBGYN | Facility: CLINIC | Age: 55
End: 2025-02-20
Payer: COMMERCIAL

## 2025-02-20 VITALS
DIASTOLIC BLOOD PRESSURE: 79 MMHG | SYSTOLIC BLOOD PRESSURE: 135 MMHG | HEIGHT: 62 IN | WEIGHT: 200 LBS | BODY MASS INDEX: 36.8 KG/M2

## 2025-02-20 DIAGNOSIS — Z01.419 ENCOUNTER FOR GYNECOLOGICAL EXAMINATION (GENERAL) (ROUTINE) W/OUT ABNORMAL FINDINGS: ICD-10-CM

## 2025-02-20 PROCEDURE — 99459 PELVIC EXAMINATION: CPT

## 2025-02-20 PROCEDURE — 99396 PREV VISIT EST AGE 40-64: CPT | Mod: 25

## 2025-02-22 ENCOUNTER — RESULT REVIEW (OUTPATIENT)
Age: 55
End: 2025-02-22

## 2025-02-22 ENCOUNTER — OUTPATIENT (OUTPATIENT)
Dept: OUTPATIENT SERVICES | Facility: HOSPITAL | Age: 55
LOS: 1 days | End: 2025-02-22
Payer: COMMERCIAL

## 2025-02-22 DIAGNOSIS — Z90.89 ACQUIRED ABSENCE OF OTHER ORGANS: Chronic | ICD-10-CM

## 2025-02-22 DIAGNOSIS — Z98.890 OTHER SPECIFIED POSTPROCEDURAL STATES: Chronic | ICD-10-CM

## 2025-02-22 DIAGNOSIS — Z12.31 ENCOUNTER FOR SCREENING MAMMOGRAM FOR MALIGNANT NEOPLASM OF BREAST: ICD-10-CM

## 2025-02-22 DIAGNOSIS — Z90.49 ACQUIRED ABSENCE OF OTHER SPECIFIED PARTS OF DIGESTIVE TRACT: Chronic | ICD-10-CM

## 2025-02-22 DIAGNOSIS — Z98.891 HISTORY OF UTERINE SCAR FROM PREVIOUS SURGERY: Chronic | ICD-10-CM

## 2025-02-22 DIAGNOSIS — S36.00XA UNSPECIFIED INJURY OF SPLEEN, INITIAL ENCOUNTER: Chronic | ICD-10-CM

## 2025-02-22 DIAGNOSIS — Z52.4 KIDNEY DONOR: Chronic | ICD-10-CM

## 2025-02-22 PROCEDURE — 77067 SCR MAMMO BI INCL CAD: CPT | Mod: 26

## 2025-02-22 PROCEDURE — 77063 BREAST TOMOSYNTHESIS BI: CPT | Mod: 26

## 2025-02-22 PROCEDURE — 77067 SCR MAMMO BI INCL CAD: CPT

## 2025-02-22 PROCEDURE — 77063 BREAST TOMOSYNTHESIS BI: CPT

## 2025-02-23 DIAGNOSIS — Z12.31 ENCOUNTER FOR SCREENING MAMMOGRAM FOR MALIGNANT NEOPLASM OF BREAST: ICD-10-CM

## 2025-04-07 NOTE — PROGRESS NOTE ADULT - PROVIDER SPECIALTY LIST ADULT
Thoracic Surgery reviewed the patient's history and review of systems.    Mallampati Airway Assessment:  Mallampati Class II - (soft palate, fauces & uvula are visible)    Prior History of Anesthesia Complications:   none    ASA Classification:  Class 2 - A normal healthy patient with mild systemic disease    Sedation/ Anesthesia Plan:   intravenous sedation    Medications Planned:   midazolam (Versed) intravenously and fentanyl intravenously    Patient is an appropriate candidate for plan of sedation: yes    Electronically signed by KAYA Arevalo on 4/7/2025 at 11:18 AM